# Patient Record
Sex: MALE | Race: WHITE | NOT HISPANIC OR LATINO | URBAN - METROPOLITAN AREA
[De-identification: names, ages, dates, MRNs, and addresses within clinical notes are randomized per-mention and may not be internally consistent; named-entity substitution may affect disease eponyms.]

---

## 2022-02-02 ENCOUNTER — OUTSIDE PLACE OF SERVICE (OUTPATIENT)
Dept: FAMILY MEDICINE | Facility: CLINIC | Age: 77
End: 2022-02-02
Payer: MEDICARE

## 2022-02-02 PROCEDURE — 99307 PR NURSING FAC CARE, SUBSEQ, IMPROVING: ICD-10-PCS | Mod: ,,, | Performed by: FAMILY MEDICINE

## 2022-02-02 PROCEDURE — 99307 SBSQ NF CARE SF MDM 10: CPT | Mod: ,,, | Performed by: FAMILY MEDICINE

## 2022-02-09 ENCOUNTER — OUTSIDE PLACE OF SERVICE (OUTPATIENT)
Dept: FAMILY MEDICINE | Facility: CLINIC | Age: 77
End: 2022-02-09
Payer: MEDICARE

## 2022-02-09 PROCEDURE — 99499 NO LOS: ICD-10-PCS | Mod: ,,, | Performed by: FAMILY MEDICINE

## 2022-02-09 PROCEDURE — 99499 UNLISTED E&M SERVICE: CPT | Mod: ,,, | Performed by: FAMILY MEDICINE

## 2022-09-27 ENCOUNTER — HOSPITAL ENCOUNTER (EMERGENCY)
Facility: HOSPITAL | Age: 77
Discharge: HOME | End: 2022-09-28
Attending: EMERGENCY MEDICINE
Payer: MEDICARE

## 2022-09-27 DIAGNOSIS — R07.9 CHEST PAIN: ICD-10-CM

## 2022-09-27 LAB
ALBUMIN SERPL BCP-MCNC: 4.3 G/DL (ref 3.5–5.2)
ALP SERPL-CCNC: 89 U/L (ref 38–126)
ALT SERPL W/O P-5'-P-CCNC: 20 U/L (ref 10–44)
ANION GAP SERPL CALC-SCNC: 9 MMOL/L (ref 8–16)
AST SERPL-CCNC: 30 U/L (ref 15–46)
BASOPHILS # BLD AUTO: 0.03 K/UL (ref 0–0.2)
BASOPHILS NFR BLD: 0.4 % (ref 0–1.9)
BILIRUB SERPL-MCNC: 0.5 MG/DL (ref 0.1–1)
CALCIUM SERPL-MCNC: 10.1 MG/DL (ref 8.7–10.5)
CHLORIDE SERPL-SCNC: 90 MMOL/L (ref 95–110)
CO2 SERPL-SCNC: 45 MMOL/L (ref 23–29)
CREAT SERPL-MCNC: 0.75 MG/DL (ref 0.5–1.4)
DIFFERENTIAL METHOD: ABNORMAL
EOSINOPHIL # BLD AUTO: 0.1 K/UL (ref 0–0.5)
EOSINOPHIL NFR BLD: 1.9 % (ref 0–8)
ERYTHROCYTE [DISTWIDTH] IN BLOOD BY AUTOMATED COUNT: 13.3 % (ref 11.5–14.5)
EST. GFR  (NO RACE VARIABLE): >60 ML/MIN/1.73 M^2
GLUCOSE SERPL-MCNC: 114 MG/DL (ref 70–110)
HCT VFR BLD AUTO: 39.5 % (ref 40–54)
HGB BLD-MCNC: 11.6 G/DL (ref 14–18)
IMM GRANULOCYTES # BLD AUTO: 0.01 K/UL (ref 0–0.04)
IMM GRANULOCYTES NFR BLD AUTO: 0.1 % (ref 0–0.5)
LYMPHOCYTES # BLD AUTO: 1.1 K/UL (ref 1–4.8)
LYMPHOCYTES NFR BLD: 15.9 % (ref 18–48)
MCH RBC QN AUTO: 27.7 PG (ref 27–31)
MCHC RBC AUTO-ENTMCNC: 29.4 G/DL (ref 32–36)
MCV RBC AUTO: 94 FL (ref 82–98)
MONOCYTES # BLD AUTO: 0.7 K/UL (ref 0.3–1)
MONOCYTES NFR BLD: 10.7 % (ref 4–15)
NEUTROPHILS # BLD AUTO: 4.9 K/UL (ref 1.8–7.7)
NEUTROPHILS NFR BLD: 71 % (ref 38–73)
NRBC BLD-RTO: 0 /100 WBC
PLATELET # BLD AUTO: 169 K/UL (ref 150–450)
PMV BLD AUTO: 10.1 FL (ref 9.2–12.9)
POTASSIUM SERPL-SCNC: 4.7 MMOL/L (ref 3.5–5.1)
PROT SERPL-MCNC: 7.5 G/DL (ref 6–8.4)
RBC # BLD AUTO: 4.19 M/UL (ref 4.6–6.2)
SODIUM SERPL-SCNC: 144 MMOL/L (ref 136–145)
TROPONIN I SERPL-MCNC: <0.012 NG/ML (ref 0.01–0.03)
UUN UR-MCNC: 14 MG/DL (ref 2–20)
WBC # BLD AUTO: 6.91 K/UL (ref 3.9–12.7)

## 2022-09-27 PROCEDURE — 93010 ELECTROCARDIOGRAM REPORT: CPT | Mod: ,,, | Performed by: INTERNAL MEDICINE

## 2022-09-27 PROCEDURE — 85025 COMPLETE CBC W/AUTO DIFF WBC: CPT | Mod: ER | Performed by: EMERGENCY MEDICINE

## 2022-09-27 PROCEDURE — 93010 EKG 12-LEAD: ICD-10-PCS | Mod: ,,, | Performed by: INTERNAL MEDICINE

## 2022-09-27 PROCEDURE — 84484 ASSAY OF TROPONIN QUANT: CPT | Mod: ER | Performed by: EMERGENCY MEDICINE

## 2022-09-27 PROCEDURE — 99285 EMERGENCY DEPT VISIT HI MDM: CPT | Mod: 25,ER

## 2022-09-27 PROCEDURE — 93005 ELECTROCARDIOGRAM TRACING: CPT | Mod: ER

## 2022-09-27 PROCEDURE — 80053 COMPREHEN METABOLIC PANEL: CPT | Mod: ER | Performed by: EMERGENCY MEDICINE

## 2022-09-27 RX ORDER — MULTIVIT-MIN/IRON FUM/FOLIC AC 7.5 MG-4
TABLET ORAL
COMMUNITY
Start: 2022-09-09 | End: 2023-01-26

## 2022-09-27 RX ORDER — DIAZEPAM 5 MG/1
5 TABLET ORAL 2 TIMES DAILY PRN
COMMUNITY
Start: 2022-09-06

## 2022-09-27 RX ORDER — ACETAMINOPHEN 500 MG
500 TABLET ORAL EVERY 6 HOURS PRN
COMMUNITY
Start: 2022-09-06 | End: 2023-01-26

## 2022-09-27 RX ORDER — GUAIFENESIN 400 MG/1
TABLET ORAL
COMMUNITY
Start: 2022-09-06 | End: 2023-01-26

## 2022-09-27 RX ORDER — CALCIUM CARBONATE 600 MG
1 TABLET ORAL 2 TIMES DAILY
COMMUNITY
Start: 2022-09-06 | End: 2023-01-26

## 2022-09-27 RX ORDER — BUSPIRONE HYDROCHLORIDE 15 MG/1
30 TABLET ORAL 2 TIMES DAILY
COMMUNITY
Start: 2022-08-08

## 2022-09-27 RX ORDER — NAPROXEN SODIUM 220 MG/1
324 TABLET, FILM COATED ORAL
Status: DISCONTINUED | OUTPATIENT
Start: 2022-09-27 | End: 2022-09-28 | Stop reason: HOSPADM

## 2022-09-27 RX ORDER — ALBUTEROL SULFATE 0.83 MG/ML
2.5 SOLUTION RESPIRATORY (INHALATION) 2 TIMES DAILY PRN
COMMUNITY
Start: 2022-06-03

## 2022-09-27 RX ORDER — PREDNISONE 10 MG/1
TABLET ORAL
COMMUNITY
Start: 2022-09-08 | End: 2023-01-26

## 2022-09-27 RX ORDER — PRAVASTATIN SODIUM 80 MG/1
80 TABLET ORAL DAILY PRN
COMMUNITY
Start: 2022-08-31 | End: 2023-01-26

## 2022-09-27 RX ORDER — APIXABAN 5 MG/1
5 TABLET, FILM COATED ORAL 2 TIMES DAILY
COMMUNITY
Start: 2022-09-06

## 2022-09-27 RX ORDER — TRAZODONE HYDROCHLORIDE 100 MG/1
400 TABLET ORAL NIGHTLY PRN
COMMUNITY
Start: 2022-09-15

## 2022-09-27 RX ORDER — CELECOXIB 100 MG/1
100 CAPSULE ORAL 2 TIMES DAILY
COMMUNITY
Start: 2022-08-29 | End: 2023-01-26

## 2022-09-27 RX ORDER — CITALOPRAM 40 MG/1
40 TABLET, FILM COATED ORAL DAILY
COMMUNITY
Start: 2022-07-06

## 2022-09-27 RX ORDER — POLYETHYLENE GLYCOL 3350 17 G/17G
POWDER, FOR SOLUTION ORAL
COMMUNITY
Start: 2022-09-06 | End: 2023-01-26

## 2022-09-27 RX ORDER — CHOLECALCIFEROL (VITAMIN D3) 50 MCG
1 TABLET ORAL DAILY
COMMUNITY
Start: 2022-08-30 | End: 2023-01-26

## 2022-09-28 VITALS
RESPIRATION RATE: 24 BRPM | SYSTOLIC BLOOD PRESSURE: 142 MMHG | WEIGHT: 168.19 LBS | OXYGEN SATURATION: 96 % | DIASTOLIC BLOOD PRESSURE: 63 MMHG | HEART RATE: 100 BPM | TEMPERATURE: 98 F

## 2022-09-28 LAB — TROPONIN I SERPL-MCNC: <0.012 NG/ML (ref 0.01–0.03)

## 2022-09-28 PROCEDURE — 84484 ASSAY OF TROPONIN QUANT: CPT | Mod: ER | Performed by: EMERGENCY MEDICINE

## 2022-09-28 NOTE — ED PROVIDER NOTES
Encounter Date: 9/27/2022       History     Chief Complaint   Patient presents with    Chest Pain     Reports to ED via EMS from [facility] c c/o chest pain x few months. Pain to all of R side of body. +R sided, sharp pain. 4L NC. Per EMS, given IM zofran, ASA, and 2 Nitro.      Patient currently presents with chief complaint of chest pain.  This began yesterday and has been sustained throughout the day today though the patient describes similar episodes intermittently for several months now.  This is localized to the mid chest region.  Patient denies shortness of breath, denies palpitations,  denies nausea, denies diaphoresis.  Radiation of pain:  none.  Patient denies aspirin use in the last 24 hours. Patient denies history of known CAD.      Review of patient's allergies indicates:  No Known Allergies  Past Medical History:   Diagnosis Date    Acute bronchospasm     Acute pharyngitis     Allergic urticaria     Anxiety disorder, unspecified     Cerebral atherosclerosis     Conjunctivitis     Constipation     COPD (chronic obstructive pulmonary disease)     Deficiency of other vitamins     Hypocalcemia     Insomnia     Malignant neoplasm of unspecified part of unspecified bronchus or lung     Mixed hyperlipidemia     Other pulmonary embolism without acute cor pulmonale     Pain     Pressure ulcer of unspecified site, unspecified stage     stage 2 of sacral region    PTSD (post-traumatic stress disorder)     Seborrhea capitis     Xerosis cutis      History reviewed. No pertinent surgical history.  History reviewed. No pertinent family history.  Social History     Tobacco Use    Smoking status: Unknown   Substance Use Topics    Alcohol use: Not Currently    Drug use: Not Currently     Review of Systems   Constitutional:  Negative for chills and fever.   HENT:  Negative for congestion and sore throat.    Respiratory:  Negative for chest tightness and shortness of breath.    Cardiovascular:  Positive for chest pain.  Negative for palpitations.   Gastrointestinal:  Negative for abdominal pain and vomiting.   Genitourinary:  Negative for difficulty urinating and dysuria.   Skin:  Negative for color change and rash.   Neurological:  Negative for weakness and numbness.   Hematological:  Negative for adenopathy.   All other systems reviewed and are negative.    Physical Exam     Initial Vitals   BP Pulse Resp Temp SpO2   09/27/22 2233 09/27/22 2233 09/27/22 2233 09/27/22 2245 09/27/22 2233   (!) 162/83 (!) 111 (!) 26 98.1 °F (36.7 °C) (!) 93 %      MAP       --                Physical Exam    Constitutional: He appears well-developed and well-nourished. He is not diaphoretic. No distress.   HENT:   Head: Normocephalic and atraumatic.   Nose: Nose normal.   Mouth/Throat: Oropharynx is clear and moist.   Eyes: Conjunctivae are normal. No scleral icterus.   Neck: Neck supple. No JVD present.   Cardiovascular:  Normal rate, regular rhythm, normal heart sounds and intact distal pulses.           Pulmonary/Chest: No respiratory distress. He has wheezes.   Abdominal: Abdomen is soft. There is no abdominal tenderness.   Musculoskeletal:         General: No edema. Normal range of motion.      Cervical back: Neck supple.     Neurological: He is alert and oriented to person, place, and time.   Skin: Skin is warm and dry.   Psychiatric: He has a normal mood and affect. His behavior is normal.       ED Course   Procedures  Labs Reviewed   CBC W/ AUTO DIFFERENTIAL - Abnormal; Notable for the following components:       Result Value    RBC 4.19 (*)     Hemoglobin 11.6 (*)     Hematocrit 39.5 (*)     MCHC 29.4 (*)     Lymph % 15.9 (*)     All other components within normal limits   COMPREHENSIVE METABOLIC PANEL - Abnormal; Notable for the following components:    Chloride 90 (*)     CO2 45 (*)     Glucose 114 (*)     All other components within normal limits    Narrative:     C02   critical result(s) called and verbal readback obtained from   Anay  Lincoln HERNADEZ by HERON 09/27/2022 23:13   TROPONIN I   TROPONIN I     EKG Readings: (Independently Interpreted)   Initial Reading: No STEMI. Rhythm: Normal Sinus Rhythm. Heart Rate: 105. Ectopy: PVCs Rare.     Imaging Results              X-Ray Chest AP Portable (Final result)  Result time 09/27/22 22:55:10      Final result by Jesse Bell MD (09/27/22 22:55:10)                   Impression:      Extensive emphysema.  Right upper lobe scarring.  No acute process seen      Electronically signed by: Ray Molina  Date:    09/27/2022  Time:    22:55               Narrative:    EXAMINATION:  XR CHEST AP PORTABLE    CLINICAL HISTORY:  Chest Pain;    TECHNIQUE:  Single frontal view of the chest was performed.    COMPARISON:  None    FINDINGS:  Suggestion of emphysematous changes.  Upper lung zone reticular opacities no pleural effusion or pneumothorax.    The cardiac silhouette is normal in size. The hilar and mediastinal contours are unremarkable.    Bones are intact.                                       Medications   aspirin chewable tablet 324 mg (324 mg Oral Not Given 9/27/22 2230)     Medical Decision Making:   Independently Interpreted Test(s):   I have ordered and independently interpreted EKG Reading(s) - see prior notes  Clinical Tests:   Lab Tests: Ordered and Reviewed  Radiological Study: Ordered and Reviewed  ED Management:  All findings were reviewed with the patient/family in detail along with the diagnosis of chest pain.  Given the extended period of sustained CP prior to arrival and initial lab draw I feel a series of unremarkable troponin levels is adequate to rule out ACS in this setting.  Additionally, I have no considerable suspicion for aortic dissection/aneurysm, esophageal perforation, or acute pulmonary complications based on the presentation, exam, lab results, or imaging.    I see no indication of an emergent process beyond that addressed during our encounter but have duly counseled the  patient/family regarding the need for prompt outpatient follow-up for further evaluation as well as the indications that should prompt immediate return to the emergency room should new or worrisome developments occur.  The patient has been provided with both verbal and written instructions following the encounter.  The patient/family communicates understanding of all this information and all remaining questions and concerns were addressed at this time.                         Clinical Impression:   Final diagnoses:  [R07.9] Chest pain        ED Disposition Condition    Discharge Stable          ED Prescriptions    None       Follow-up Information       Follow up With Specialties Details Why Contact Info    PCP  Schedule an appointment as soon as possible for a visit  for reassessment     [facility] Emergency Dept Emergency Medicine Go to  As needed, If symptoms worsen [address] [facility] [address] [phone]             Bert Arboleda MD  09/28/22 2548

## 2022-10-31 ENCOUNTER — TELEPHONE (OUTPATIENT)
Dept: FAMILY MEDICINE | Facility: CLINIC | Age: 77
End: 2022-10-31
Payer: MEDICARE

## 2022-10-31 NOTE — TELEPHONE ENCOUNTER
Please sign the CTI that was faxed here on today and fax it back to them.  [facility]     ------------------------------------------------------------------------- Message from Martell Ontiveros sent at 10/31/2022  1:11 PM CDT -----  Contact: Harriet  .Type:  Needs Medical Advice    Who Called: [facility]  Would the patient rather a call back or a response via [facility]? Call  Best Call Back Number: [phone]  Additional Information:   Marily wants to know if [facility] needs the CTI faxed or picked up.

## 2023-01-25 ENCOUNTER — HOSPITAL ENCOUNTER (OUTPATIENT)
Facility: HOSPITAL | Age: 78
End: 2023-01-28
Attending: EMERGENCY MEDICINE | Admitting: EMERGENCY MEDICINE
Payer: MEDICARE

## 2023-01-25 DIAGNOSIS — J44.1 COPD WITH ACUTE EXACERBATION: Primary | ICD-10-CM

## 2023-01-25 DIAGNOSIS — R06.02 SOB (SHORTNESS OF BREATH): ICD-10-CM

## 2023-01-25 LAB
ALBUMIN SERPL BCP-MCNC: 3.6 G/DL (ref 3.5–5.2)
ALP SERPL-CCNC: 104 U/L (ref 55–135)
ALT SERPL W/O P-5'-P-CCNC: 19 U/L (ref 10–44)
ANION GAP SERPL CALC-SCNC: 9 MMOL/L (ref 8–16)
AST SERPL-CCNC: 22 U/L (ref 10–40)
BASOPHILS # BLD AUTO: 0.01 K/UL (ref 0–0.2)
BASOPHILS NFR BLD: 0.2 % (ref 0–1.9)
BILIRUB SERPL-MCNC: 0.5 MG/DL (ref 0.1–1)
BNP SERPL-MCNC: <10 PG/ML (ref 0–99)
BUN SERPL-MCNC: 13 MG/DL (ref 8–23)
CALCIUM SERPL-MCNC: 10 MG/DL (ref 8.7–10.5)
CHLORIDE SERPL-SCNC: 96 MMOL/L (ref 95–110)
CO2 SERPL-SCNC: 36 MMOL/L (ref 23–29)
CREAT SERPL-MCNC: 0.9 MG/DL (ref 0.5–1.4)
DIFFERENTIAL METHOD: ABNORMAL
EOSINOPHIL # BLD AUTO: 0 K/UL (ref 0–0.5)
EOSINOPHIL NFR BLD: 0 % (ref 0–8)
ERYTHROCYTE [DISTWIDTH] IN BLOOD BY AUTOMATED COUNT: 14.8 % (ref 11.5–14.5)
EST. GFR  (NO RACE VARIABLE): >60 ML/MIN/1.73 M^2
GLUCOSE SERPL-MCNC: 117 MG/DL (ref 70–110)
HCT VFR BLD AUTO: 41.5 % (ref 40–54)
HGB BLD-MCNC: 12.3 G/DL (ref 14–18)
IMM GRANULOCYTES # BLD AUTO: 0.01 K/UL (ref 0–0.04)
IMM GRANULOCYTES NFR BLD AUTO: 0.2 % (ref 0–0.5)
LYMPHOCYTES # BLD AUTO: 0.6 K/UL (ref 1–4.8)
LYMPHOCYTES NFR BLD: 11.3 % (ref 18–48)
MCH RBC QN AUTO: 26.6 PG (ref 27–31)
MCHC RBC AUTO-ENTMCNC: 29.6 G/DL (ref 32–36)
MCV RBC AUTO: 90 FL (ref 82–98)
MONOCYTES # BLD AUTO: 0.1 K/UL (ref 0.3–1)
MONOCYTES NFR BLD: 2.7 % (ref 4–15)
NEUTROPHILS # BLD AUTO: 4.4 K/UL (ref 1.8–7.7)
NEUTROPHILS NFR BLD: 85.6 % (ref 38–73)
NRBC BLD-RTO: 0 /100 WBC
PLATELET # BLD AUTO: 187 K/UL (ref 150–450)
PMV BLD AUTO: 10.6 FL (ref 9.2–12.9)
POTASSIUM SERPL-SCNC: 4.7 MMOL/L (ref 3.5–5.1)
PROT SERPL-MCNC: 7.1 G/DL (ref 6–8.4)
RBC # BLD AUTO: 4.63 M/UL (ref 4.6–6.2)
SODIUM SERPL-SCNC: 141 MMOL/L (ref 136–145)
TROPONIN I SERPL DL<=0.01 NG/ML-MCNC: 0.02 NG/ML (ref 0–0.03)
WBC # BLD AUTO: 5.13 K/UL (ref 3.9–12.7)

## 2023-01-25 PROCEDURE — 93010 EKG 12-LEAD: ICD-10-PCS | Mod: ,,, | Performed by: INTERNAL MEDICINE

## 2023-01-25 PROCEDURE — 93005 ELECTROCARDIOGRAM TRACING: CPT

## 2023-01-25 PROCEDURE — 80053 COMPREHEN METABOLIC PANEL: CPT | Performed by: EMERGENCY MEDICINE

## 2023-01-25 PROCEDURE — 93010 ELECTROCARDIOGRAM REPORT: CPT | Mod: ,,, | Performed by: INTERNAL MEDICINE

## 2023-01-25 PROCEDURE — 85025 COMPLETE CBC W/AUTO DIFF WBC: CPT | Performed by: EMERGENCY MEDICINE

## 2023-01-25 PROCEDURE — 83880 ASSAY OF NATRIURETIC PEPTIDE: CPT | Performed by: EMERGENCY MEDICINE

## 2023-01-25 PROCEDURE — 99285 PR EMERGENCY DEPT VISIT,LEVEL V: ICD-10-PCS | Mod: CS,,, | Performed by: EMERGENCY MEDICINE

## 2023-01-25 PROCEDURE — 84484 ASSAY OF TROPONIN QUANT: CPT | Performed by: EMERGENCY MEDICINE

## 2023-01-25 PROCEDURE — 99285 EMERGENCY DEPT VISIT HI MDM: CPT | Mod: 25

## 2023-01-25 PROCEDURE — 99285 EMERGENCY DEPT VISIT HI MDM: CPT | Mod: CS,,, | Performed by: EMERGENCY MEDICINE

## 2023-01-25 RX ORDER — IPRATROPIUM BROMIDE AND ALBUTEROL SULFATE 2.5; .5 MG/3ML; MG/3ML
3 SOLUTION RESPIRATORY (INHALATION)
Status: COMPLETED | OUTPATIENT
Start: 2023-01-26 | End: 2023-01-26

## 2023-01-25 RX ORDER — METHYLPREDNISOLONE SOD SUCC 125 MG
125 VIAL (EA) INJECTION
Status: COMPLETED | OUTPATIENT
Start: 2023-01-26 | End: 2023-01-26

## 2023-01-26 PROBLEM — J44.1 COPD WITH ACUTE EXACERBATION: Status: ACTIVE | Noted: 2023-01-26

## 2023-01-26 PROBLEM — I26.99 OTHER PULMONARY EMBOLISM WITHOUT ACUTE COR PULMONALE: Status: ACTIVE | Noted: 2023-01-26

## 2023-01-26 PROBLEM — F41.9 ANXIETY: Status: ACTIVE | Noted: 2023-01-26

## 2023-01-26 PROBLEM — E78.5 HLD (HYPERLIPIDEMIA): Status: ACTIVE | Noted: 2023-01-26

## 2023-01-26 LAB
ALLENS TEST: ABNORMAL
ANION GAP SERPL CALC-SCNC: 14 MMOL/L (ref 8–16)
BASOPHILS # BLD AUTO: 0 K/UL (ref 0–0.2)
BASOPHILS NFR BLD: 0 % (ref 0–1.9)
BUN SERPL-MCNC: 14 MG/DL (ref 8–23)
CALCIUM SERPL-MCNC: 9.9 MG/DL (ref 8.7–10.5)
CHLORIDE SERPL-SCNC: 98 MMOL/L (ref 95–110)
CO2 SERPL-SCNC: 29 MMOL/L (ref 23–29)
CREAT SERPL-MCNC: 0.8 MG/DL (ref 0.5–1.4)
D DIMER PPP IA.FEU-MCNC: 0.64 MG/L FEU
DELSYS: ABNORMAL
DIFFERENTIAL METHOD: ABNORMAL
EOSINOPHIL # BLD AUTO: 0 K/UL (ref 0–0.5)
EOSINOPHIL NFR BLD: 0 % (ref 0–8)
ERYTHROCYTE [DISTWIDTH] IN BLOOD BY AUTOMATED COUNT: 14.8 % (ref 11.5–14.5)
EST. GFR  (NO RACE VARIABLE): >60 ML/MIN/1.73 M^2
GLUCOSE SERPL-MCNC: 135 MG/DL (ref 70–110)
HCO3 UR-SCNC: 36.1 MMOL/L (ref 24–28)
HCT VFR BLD AUTO: 40.3 % (ref 40–54)
HGB BLD-MCNC: 11.8 G/DL (ref 14–18)
IMM GRANULOCYTES # BLD AUTO: 0.02 K/UL (ref 0–0.04)
IMM GRANULOCYTES NFR BLD AUTO: 0.4 % (ref 0–0.5)
LYMPHOCYTES # BLD AUTO: 0.3 K/UL (ref 1–4.8)
LYMPHOCYTES NFR BLD: 5.6 % (ref 18–48)
MAGNESIUM SERPL-MCNC: 2 MG/DL (ref 1.6–2.6)
MCH RBC QN AUTO: 26.6 PG (ref 27–31)
MCHC RBC AUTO-ENTMCNC: 29.3 G/DL (ref 32–36)
MCV RBC AUTO: 91 FL (ref 82–98)
MODE: ABNORMAL
MONOCYTES # BLD AUTO: 0.1 K/UL (ref 0.3–1)
MONOCYTES NFR BLD: 1.3 % (ref 4–15)
NEUTROPHILS # BLD AUTO: 4.5 K/UL (ref 1.8–7.7)
NEUTROPHILS NFR BLD: 92.7 % (ref 38–73)
NRBC BLD-RTO: 0 /100 WBC
PCO2 BLDA: 54.9 MMHG (ref 35–45)
PH SMN: 7.42 [PH] (ref 7.35–7.45)
PHOSPHATE SERPL-MCNC: 2.8 MG/DL (ref 2.7–4.5)
PLATELET # BLD AUTO: 172 K/UL (ref 150–450)
PMV BLD AUTO: 9.7 FL (ref 9.2–12.9)
PO2 BLDA: 139 MMHG (ref 80–100)
POC BE: 12 MMOL/L
POC SATURATED O2: 99 % (ref 95–100)
POC TCO2: 38 MMOL/L (ref 23–27)
POTASSIUM SERPL-SCNC: 4.4 MMOL/L (ref 3.5–5.1)
RBC # BLD AUTO: 4.43 M/UL (ref 4.6–6.2)
SAMPLE: ABNORMAL
SARS-COV-2 RDRP RESP QL NAA+PROBE: NEGATIVE
SITE: ABNORMAL
SODIUM SERPL-SCNC: 141 MMOL/L (ref 136–145)
WBC # BLD AUTO: 4.8 K/UL (ref 3.9–12.7)

## 2023-01-26 PROCEDURE — 25000242 PHARM REV CODE 250 ALT 637 W/ HCPCS

## 2023-01-26 PROCEDURE — 63600175 PHARM REV CODE 636 W HCPCS

## 2023-01-26 PROCEDURE — 36600 WITHDRAWAL OF ARTERIAL BLOOD: CPT

## 2023-01-26 PROCEDURE — G0378 HOSPITAL OBSERVATION PER HR: HCPCS

## 2023-01-26 PROCEDURE — 85379 FIBRIN DEGRADATION QUANT: CPT

## 2023-01-26 PROCEDURE — 94640 AIRWAY INHALATION TREATMENT: CPT | Mod: XB

## 2023-01-26 PROCEDURE — 94761 N-INVAS EAR/PLS OXIMETRY MLT: CPT

## 2023-01-26 PROCEDURE — 25000003 PHARM REV CODE 250

## 2023-01-26 PROCEDURE — U0002 COVID-19 LAB TEST NON-CDC: HCPCS | Performed by: EMERGENCY MEDICINE

## 2023-01-26 PROCEDURE — 27000190 HC CPAP FULL FACE MASK W/VALVE

## 2023-01-26 PROCEDURE — 94660 CPAP INITIATION&MGMT: CPT

## 2023-01-26 PROCEDURE — 80048 BASIC METABOLIC PNL TOTAL CA: CPT

## 2023-01-26 PROCEDURE — 99223 1ST HOSP IP/OBS HIGH 75: CPT | Mod: ,,,

## 2023-01-26 PROCEDURE — 96374 THER/PROPH/DIAG INJ IV PUSH: CPT

## 2023-01-26 PROCEDURE — 25000242 PHARM REV CODE 250 ALT 637 W/ HCPCS: Performed by: EMERGENCY MEDICINE

## 2023-01-26 PROCEDURE — 85025 COMPLETE CBC W/AUTO DIFF WBC: CPT

## 2023-01-26 PROCEDURE — 82803 BLOOD GASES ANY COMBINATION: CPT

## 2023-01-26 PROCEDURE — 99223 PR INITIAL HOSPITAL CARE,LEVL III: ICD-10-PCS | Mod: ,,,

## 2023-01-26 PROCEDURE — 99900035 HC TECH TIME PER 15 MIN (STAT)

## 2023-01-26 PROCEDURE — 63600175 PHARM REV CODE 636 W HCPCS: Performed by: EMERGENCY MEDICINE

## 2023-01-26 PROCEDURE — 84100 ASSAY OF PHOSPHORUS: CPT

## 2023-01-26 PROCEDURE — 27000221 HC OXYGEN, UP TO 24 HOURS

## 2023-01-26 PROCEDURE — 83735 ASSAY OF MAGNESIUM: CPT

## 2023-01-26 RX ORDER — CAMPHOR AND MENTHOL 5; 5 MG/ML; MG/ML
LOTION TOPICAL
COMMUNITY

## 2023-01-26 RX ORDER — ACETAMINOPHEN 500 MG
1000 TABLET ORAL EVERY 8 HOURS PRN
Status: DISCONTINUED | OUTPATIENT
Start: 2023-01-26 | End: 2023-01-28 | Stop reason: HOSPADM

## 2023-01-26 RX ORDER — ACETAMINOPHEN 325 MG/1
650 TABLET ORAL EVERY 4 HOURS PRN
Status: DISCONTINUED | OUTPATIENT
Start: 2023-01-26 | End: 2023-01-28 | Stop reason: HOSPADM

## 2023-01-26 RX ORDER — IPRATROPIUM BROMIDE AND ALBUTEROL SULFATE 2.5; .5 MG/3ML; MG/3ML
3 SOLUTION RESPIRATORY (INHALATION)
Status: COMPLETED | OUTPATIENT
Start: 2023-01-26 | End: 2023-01-26

## 2023-01-26 RX ORDER — IPRATROPIUM BROMIDE AND ALBUTEROL SULFATE 2.5; .5 MG/3ML; MG/3ML
3 SOLUTION RESPIRATORY (INHALATION)
Status: DISCONTINUED | OUTPATIENT
Start: 2023-01-26 | End: 2023-01-26

## 2023-01-26 RX ORDER — ALBUTEROL SULFATE 90 UG/1
2 AEROSOL, METERED RESPIRATORY (INHALATION) 4 TIMES DAILY PRN
COMMUNITY

## 2023-01-26 RX ORDER — FLUTICASONE FUROATE AND VILANTEROL 100; 25 UG/1; UG/1
1 POWDER RESPIRATORY (INHALATION) DAILY
Status: DISCONTINUED | OUTPATIENT
Start: 2023-01-26 | End: 2023-01-28 | Stop reason: HOSPADM

## 2023-01-26 RX ORDER — IPRATROPIUM BROMIDE AND ALBUTEROL SULFATE 2.5; .5 MG/3ML; MG/3ML
SOLUTION RESPIRATORY (INHALATION)
Status: COMPLETED
Start: 2023-01-26 | End: 2023-01-26

## 2023-01-26 RX ORDER — UREA 200 MG/G
CREAM TOPICAL
COMMUNITY

## 2023-01-26 RX ORDER — TIOTROPIUM BROMIDE INHALATION SPRAY 3.12 UG/1
2 SPRAY, METERED RESPIRATORY (INHALATION) DAILY
COMMUNITY

## 2023-01-26 RX ORDER — CITALOPRAM 20 MG/1
40 TABLET, FILM COATED ORAL DAILY
Status: DISCONTINUED | OUTPATIENT
Start: 2023-01-26 | End: 2023-01-28 | Stop reason: HOSPADM

## 2023-01-26 RX ORDER — KETOCONAZOLE 20 MG/ML
SHAMPOO, SUSPENSION TOPICAL
COMMUNITY

## 2023-01-26 RX ORDER — ONDANSETRON 8 MG/1
8 TABLET, ORALLY DISINTEGRATING ORAL EVERY 8 HOURS PRN
Status: DISCONTINUED | OUTPATIENT
Start: 2023-01-26 | End: 2023-01-28 | Stop reason: HOSPADM

## 2023-01-26 RX ORDER — PRAVASTATIN SODIUM 40 MG/1
80 TABLET ORAL DAILY
Status: DISCONTINUED | OUTPATIENT
Start: 2023-01-26 | End: 2023-01-28 | Stop reason: HOSPADM

## 2023-01-26 RX ORDER — PRAZOSIN HYDROCHLORIDE 5 MG/1
5 CAPSULE ORAL NIGHTLY
COMMUNITY

## 2023-01-26 RX ORDER — FLUTICASONE PROPIONATE AND SALMETEROL 250; 50 UG/1; UG/1
1 POWDER RESPIRATORY (INHALATION) 2 TIMES DAILY
COMMUNITY

## 2023-01-26 RX ORDER — SODIUM CHLORIDE 0.9 % (FLUSH) 0.9 %
3 SYRINGE (ML) INJECTION
Status: DISCONTINUED | OUTPATIENT
Start: 2023-01-26 | End: 2023-01-28 | Stop reason: HOSPADM

## 2023-01-26 RX ORDER — IPRATROPIUM BROMIDE AND ALBUTEROL SULFATE 2.5; .5 MG/3ML; MG/3ML
3 SOLUTION RESPIRATORY (INHALATION) EVERY 6 HOURS PRN
Status: DISCONTINUED | OUTPATIENT
Start: 2023-01-26 | End: 2023-01-28 | Stop reason: HOSPADM

## 2023-01-26 RX ORDER — POLYETHYLENE GLYCOL 3350 17 G/17G
17 POWDER, FOR SOLUTION ORAL 2 TIMES DAILY PRN
Status: DISCONTINUED | OUTPATIENT
Start: 2023-01-26 | End: 2023-01-28 | Stop reason: HOSPADM

## 2023-01-26 RX ORDER — HYDROCORTISONE 25 MG/G
CREAM TOPICAL
COMMUNITY

## 2023-01-26 RX ORDER — ROFLUMILAST 500 UG/1
500 TABLET ORAL DAILY
COMMUNITY

## 2023-01-26 RX ORDER — TRAZODONE HYDROCHLORIDE 100 MG/1
200 TABLET ORAL NIGHTLY PRN
Status: DISCONTINUED | OUTPATIENT
Start: 2023-01-26 | End: 2023-01-28 | Stop reason: HOSPADM

## 2023-01-26 RX ORDER — PREDNISONE 20 MG/1
40 TABLET ORAL DAILY
Status: DISCONTINUED | OUTPATIENT
Start: 2023-01-26 | End: 2023-01-28 | Stop reason: HOSPADM

## 2023-01-26 RX ORDER — GUAIFENESIN/DEXTROMETHORPHAN 100-10MG/5
5 SYRUP ORAL EVERY 6 HOURS PRN
Status: DISCONTINUED | OUTPATIENT
Start: 2023-01-26 | End: 2023-01-28 | Stop reason: HOSPADM

## 2023-01-26 RX ORDER — KETOCONAZOLE 20 MG/G
CREAM TOPICAL
COMMUNITY

## 2023-01-26 RX ORDER — PROMETHAZINE HYDROCHLORIDE 25 MG/1
25 TABLET ORAL EVERY 6 HOURS PRN
Status: DISCONTINUED | OUTPATIENT
Start: 2023-01-26 | End: 2023-01-28 | Stop reason: HOSPADM

## 2023-01-26 RX ORDER — GUAIFENESIN 100 MG/5ML
20 SOLUTION ORAL 2 TIMES DAILY PRN
COMMUNITY

## 2023-01-26 RX ADMIN — GUAIFENESIN AND DEXTROMETHORPHAN 5 ML: 100; 10 SYRUP ORAL at 10:01

## 2023-01-26 RX ADMIN — PRAVASTATIN SODIUM 80 MG: 80 TABLET ORAL at 10:01

## 2023-01-26 RX ADMIN — IPRATROPIUM BROMIDE AND ALBUTEROL SULFATE 3 ML: .5; 3 SOLUTION RESPIRATORY (INHALATION) at 07:01

## 2023-01-26 RX ADMIN — IPRATROPIUM BROMIDE AND ALBUTEROL SULFATE 3 ML: .5; 3 SOLUTION RESPIRATORY (INHALATION) at 09:01

## 2023-01-26 RX ADMIN — GUAIFENESIN AND DEXTROMETHORPHAN 5 ML: 100; 10 SYRUP ORAL at 08:01

## 2023-01-26 RX ADMIN — IPRATROPIUM BROMIDE AND ALBUTEROL SULFATE 3 ML: .5; 3 SOLUTION RESPIRATORY (INHALATION) at 12:01

## 2023-01-26 RX ADMIN — CITALOPRAM HYDROBROMIDE 40 MG: 20 TABLET ORAL at 08:01

## 2023-01-26 RX ADMIN — BUSPIRONE HYDROCHLORIDE 15 MG: 10 TABLET ORAL at 08:01

## 2023-01-26 RX ADMIN — APIXABAN 5 MG: 5 TABLET, FILM COATED ORAL at 08:01

## 2023-01-26 RX ADMIN — METHYLPREDNISOLONE SODIUM SUCCINATE 125 MG: 125 INJECTION, POWDER, FOR SOLUTION INTRAMUSCULAR; INTRAVENOUS at 12:01

## 2023-01-26 RX ADMIN — APIXABAN 5 MG: 5 TABLET, FILM COATED ORAL at 10:01

## 2023-01-26 RX ADMIN — FLUTICASONE FUROATE AND VILANTEROL TRIFENATATE 1 PUFF: 100; 25 POWDER RESPIRATORY (INHALATION) at 12:01

## 2023-01-26 RX ADMIN — BUSPIRONE HYDROCHLORIDE 15 MG: 10 TABLET ORAL at 10:01

## 2023-01-26 RX ADMIN — IPRATROPIUM BROMIDE AND ALBUTEROL SULFATE 3 ML: 2.5; .5 SOLUTION RESPIRATORY (INHALATION) at 10:01

## 2023-01-26 RX ADMIN — TRAZODONE HYDROCHLORIDE 200 MG: 50 TABLET ORAL at 04:01

## 2023-01-26 RX ADMIN — IPRATROPIUM BROMIDE AND ALBUTEROL SULFATE 3 ML: .5; 3 SOLUTION RESPIRATORY (INHALATION) at 02:01

## 2023-01-26 RX ADMIN — PREDNISONE 40 MG: 20 TABLET ORAL at 08:01

## 2023-01-26 NOTE — H&P
"[facility] - Emergency Dept  [facility] Medicine  History & Physical    Patient Name: Gabriel Avendano  MRN: [id number]  Patient Class: OP- Observation  Admission Date: 1/25/2023  Attending Physician: Natali Merritt MD   Primary Care Provider: Primary Doctor No         Patient information was obtained from patient and ER records.     Subjective:     Principal Problem:COPD with acute exacerbation    Chief Complaint:   Chief Complaint   Patient presents with    Shortness of Breath     SOB started last night. [facility]. 3L NC home oxygen---- 100%. Pt states "I think I need to get admitted."         HPI: Gabriel Avendano is a 77M with COPD on home 3 L nasal cannula, HTN, HLD, lung cancer, anxiety presenting with acute onset shortness of breath.  He reports that last night at his nursing home, he was put on a new CPAP machine and they did not know how to connect it to the machine, but left the mask on him all night, without supplemental oxygen, and this morning he was found to be saturating in the 60s. [facility].  He reports that he was told there that he could not be admitted because there were no beds.  He still feels short of breath and is quite worried. He has cough with white sputum at baseline, no severe worsening. Also has chest pain that is reproducible with touch that has been present for years  without acute changes. He denies any recent fevers or chills. Denies other complaint such as dysuria, leg swelling, headache, falls.     In the ED, tachycardic and tachypneic. % on home 3L. Afebrile and normotensive. CBC and CMP unremarkable. BNP and trop negative. Covid negative. VBG with compensated respiratory acidosis. CXR with hyperinflated lungs with emphysematous architecture. Linear scarring mid to upper right lung, similar to prior. Given duonebs x3 and methylprednisolone IV.       Past Medical History:   Diagnosis Date    Acute " bronchospasm     Acute pharyngitis     Allergic urticaria     Anxiety disorder, unspecified     Cerebral atherosclerosis     Conjunctivitis     Constipation     COPD (chronic obstructive pulmonary disease)     Deficiency of other vitamins     Hypocalcemia     Insomnia     Malignant neoplasm of unspecified part of unspecified bronchus or lung     Mixed hyperlipidemia     Other pulmonary embolism without acute cor pulmonale     Pain     Pressure ulcer of unspecified site, unspecified stage     stage 2 of sacral region    PTSD (post-traumatic stress disorder)     Seborrhea capitis     Xerosis cutis        History reviewed. No pertinent surgical history.    Review of patient's allergies indicates:  No Known Allergies    No current facility-administered medications on file prior to encounter.     Current Outpatient Medications on File Prior to Encounter   Medication Sig    acetaminophen (TYLENOL) 500 MG tablet Take 500 mg by mouth every 6 (six) hours as needed.    albuterol (PROVENTIL) 2.5 mg /3 mL (0.083 %) nebulizer solution Take by nebulization.    busPIRone (BUSPAR) 15 MG tablet Take by mouth 2 (two) times daily.    calcium carbonate (OS-LING) 600 mg calcium (1,500 mg) Tab Take 1 tablet by mouth 2 (two) times daily.    celecoxib (CELEBREX) 100 MG capsule Take 100 mg by mouth 2 (two) times daily.    cholecalciferol, vitamin D3, (VITAMIN D3) 50 mcg (2,000 unit) Tab Take 1 tablet by mouth once daily.    citalopram (CELEXA) 40 MG tablet Take 40 mg by mouth once daily.    diazePAM (VALIUM) 5 MG tablet Take 5 mg by mouth 2 (two) times daily as needed.    ELIQUIS 5 mg Tab Take 5 mg by mouth 2 (two) times daily.    guaiFENesin (HUMIBID E) 400 mg Tab Take by mouth.    multivit-min-iron fum-folic ac 7.5 mg iron-400 mcg Tab Take by mouth.    polyethylene glycol (GLYCOLAX) 17 gram/dose powder Take by mouth.    pravastatin (PRAVACHOL) 80 MG tablet Take 80 mg by mouth daily as needed.    predniSONE  (DELTASONE) 10 MG tablet Take by mouth.    traZODone (DESYREL) 100 MG tablet Take 200 mg by mouth nightly as needed.     Family History    None       Tobacco Use    Smoking status: Unknown    Smokeless tobacco: Not on file   Substance and Sexual Activity    Alcohol use: Not Currently    Drug use: Not Currently    Sexual activity: Not on file     Review of Systems   Constitutional:  Negative for activity change, chills and fever.   HENT:  Negative for trouble swallowing.    Eyes:  Negative for photophobia and visual disturbance.   Respiratory:  Positive for cough and shortness of breath. Negative for chest tightness and wheezing.    Cardiovascular:  Positive for chest pain (chronic, unchanged). Negative for palpitations and leg swelling.   Gastrointestinal:  Negative for abdominal pain, constipation, diarrhea, nausea and vomiting.   Genitourinary:  Negative for dysuria, frequency, hematuria and urgency.   Musculoskeletal:  Negative for arthralgias, back pain and gait problem (wheelchair at baseline).   Skin:  Positive for rash (after chemo). Negative for color change.   Neurological:  Negative for dizziness, syncope, light-headedness, numbness and headaches.   Psychiatric/Behavioral:  Negative for agitation and confusion. The patient is nervous/anxious.    Objective:     Vital Signs (Most Recent):  Temp: 98.8 °F (37.1 °C) (01/25/23 2043)  Pulse: 105 (01/26/23 0240)  Resp: (!) 24 (01/26/23 0240)  BP: 136/74 (01/26/23 0100)  SpO2: 98 % (01/26/23 0240)   Vital Signs (24h Range):  Temp:  [98.8 °F (37.1 °C)] 98.8 °F (37.1 °C)  Pulse:  [100-109] 105  Resp:  [17-24] 24  SpO2:  [98 %-100 %] 98 %  BP: (123-149)/(73-82) 136/74        There is no height or weight on file to calculate BMI.    Physical Exam  Vitals and nursing note reviewed.   Constitutional:       General: He is not in acute distress.     Appearance: He is well-developed.   HENT:      Head: Normocephalic and atraumatic.      Mouth/Throat:      Pharynx: No  oropharyngeal exudate.   Eyes:      Extraocular Movements: Extraocular movements intact.      Conjunctiva/sclera: Conjunctivae normal.   Cardiovascular:      Rate and Rhythm: Regular rhythm. Tachycardia present.      Heart sounds: Normal heart sounds.   Pulmonary:      Comments: Diminished air movement. Some increased WOB  No wheezes, rales, rhonchi   Abdominal:      General: Bowel sounds are normal. There is no distension.      Palpations: Abdomen is soft.      Tenderness: There is no abdominal tenderness.   Musculoskeletal:         General: No tenderness. Normal range of motion.      Cervical back: Normal range of motion and neck supple.      Right lower leg: No edema.      Left lower leg: No edema.   Skin:     General: Skin is warm and dry.      Capillary Refill: Capillary refill takes less than 2 seconds.      Findings: Rash present.      Comments: Diffuse skin rash (present since chemotherapy tx per pt and follows with hemo/onc)   Neurological:      General: No focal deficit present.      Mental Status: He is alert and oriented to person, place, and time.      Cranial Nerves: No cranial nerve deficit.   Psychiatric:         Behavior: Behavior normal.         Thought Content: Thought content normal.         Judgment: Judgment normal.      Comments: Slightly anxious about prospect of being discharged at some point           Significant Labs: All pertinent labs within the past 24 hours have been reviewed.  ABGs:   Recent Labs   Lab 01/26/23  0240   PH 7.425   PCO2 54.9*   HCO3 36.1*   POCSATURATED 99   BE 12   PO2 139*     CBC:   Recent Labs   Lab 01/25/23 2146 01/26/23  0404   WBC 5.13 4.80   HGB 12.3* 11.8*   HCT 41.5 40.3    172     CMP:   Recent Labs   Lab 01/25/23 2146 01/26/23  0404    141   K 4.7 4.4   CL 96 98   CO2 36* 29   * 135*   BUN 13 14   CREATININE 0.9 0.8   CALCIUM 10.0 9.9   PROT 7.1  --    ALBUMIN 3.6  --    BILITOT 0.5  --    ALKPHOS 104  --    AST 22  --    ALT 19  --     ANIONGAP 9 14     Cardiac Markers:   Recent Labs   Lab 01/25/23  2146   BNP <10     Magnesium:   Recent Labs   Lab 01/26/23  0404   MG 2.0     Troponin:   Recent Labs   Lab 01/25/23 2146   TROPONINI 0.017       Significant Imaging: I have reviewed all pertinent imaging results/findings within the past 24 hours.  X-Ray Chest AP Portable  Narrative: EXAMINATION:  XR CHEST AP PORTABLE    CLINICAL HISTORY:  CHF;    TECHNIQUE:  Single frontal view of the chest was performed.    COMPARISON:  09/27/2022.    FINDINGS:  Hyperinflated lungs with emphysematous architecture.  Linear scarring mid to upper right lung, similar to prior.    No pneumothorax.    Heart and lungs  appear unchanged when allowing for differences in technique and positioning.  Impression: Hyperinflated lungs with emphysematous architecture. Linear scarring mid to upper right lung, similar to prior.    No significant change from prior study.    Electronically signed by: Milton De La Garza MD  Date:    01/26/2023  Time:    00:04       Assessment/Plan:     * COPD with acute exacerbation  77year old male with COPD and lung cancer (follows with heme/onc) who presents with increased dyspnea, increased WOB. No increased sputum production. Reportedly had O2 sats of 60% at nursing home earlier. Covid negative, no recent illness.  - Afebrile without leukocytosis  - requires 3L supplemental oxygen at baseline  - pulse oximetry 98% on 3L nasal cannula  - CXR demonstrated Hyperinflated lungs with emphysematous architecture, no acute change  - Venous Blood Gas result:  pO2 139, pCO2 54.9; pH 7.425;  HCO3 36, %O2 Sat 99  - S/p nebs x3 and methylprednisolone in the ED  - started Prednisone 40 mg daily x 5 days   - scheduled DuoNebs ordered  - Mucinex ordered  - CPAP qhs  - discuss CPAP technique/set up with nursing home prior to discharge to ensure proper use    Other pulmonary embolism without acute cor pulmonale  -history noted, continue home eliquis 5 mg BID    HLD  (hyperlipidemia)  -cont home pravastatin     Anxiety  Cont home buspar, celexa, and trazodone.     VTE Risk Mitigation (From admission, onward)         Ordered     apixaban tablet 5 mg  2 times daily         01/26/23 0612     IP VTE HIGH RISK PATIENT  Once         01/26/23 0209     Place sequential compression device  Until discontinued         01/26/23 0209                   Genevieve Shook PA-C  Department of Hospital Medicine   [facility] - Emergency Dept

## 2023-01-26 NOTE — PLAN OF CARE
"SW met with patient. Patient is a resident at [facility]. Patient stated that he [facility] Patient stated that he is 70% vetted. Patient stated that [facility] [place], "But it was for me" 'i wanted to be closer to my family".     Sera Stephen, MARY, [place] [place] Social Worker/  ER Department   [facility] - Emergency Dept  Initial Discharge Assessment       Primary Care Provider: Primary Doctor No    Admission Diagnosis: No admission diagnoses are documented for this encounter.    Admission Date: 1/25/2023  Expected Discharge Date:     Discharge Barriers Identified: (P) None    Payor: MEDICARE / Plan: MEDICARE PART A & B / Product Type: Government /     Extended Emergency Contact Information  Primary Emergency Contact: Manish Mead  Mobile Phone: [phone]  Relation: Friend  Preferred language: English   needed? No  Secondary Emergency Contact: dagoberto franklin  [place] Phone: [phone]  Mobile Phone: [phone]  Relation: Relative  Preferred language: English   needed? No    Discharge Plan A: (P) Return to nursing home  Discharge Plan B: (P) Return to Nursing Home    No Pharmacies Listed    Initial Assessment (most recent)       Adult Discharge Assessment - 01/26/23 0112          Discharge Assessment    Assessment Type Discharge Planning Assessment (P)      Confirmed/corrected address, phone number and insurance Yes (P)      Confirmed Demographics Correct on Facesheet (P)      Source of Information patient (P)      Does patient/caregiver understand observation status Yes (P)      Communicated ZACKARY with patient/caregiver Yes (P)      Reason For Admission SOB (P)      People in Home facility resident (P)      Facility Arrived From: [facility] (P)      Do you expect to return to your current living situation? Yes (P)      Do you have help at home or someone to help you manage your care at " home? No (P)      Prior to hospitilization cognitive status: Alert/Oriented (P)      Current cognitive status: Alert/Oriented (P)      Walking or Climbing Stairs ambulation difficulty, dependent;stair climbing difficulty, dependent;transferring difficulty, dependent (P)      Mobility Management wheel chair (P)      Dressing/Bathing bathing difficulty, dependent;dressing difficulty, dependent (P)      Equipment Currently Used at Home CPAP;wheelchair;oxygen (P)      Readmission within 30 days? No (P)      Patient currently being followed by outpatient case management? No (P)      Do you currently have service(s) that help you manage your care at home? No (P)      Do you take prescription medications? Yes (P)      Do you have prescription coverage? Yes (P)      Do you have any problems affording any of your prescribed medications? No (P)      Is the patient taking medications as prescribed? yes (P)      How do you get to doctors appointments? agency (P)      Are you on dialysis? No (P)      Do you take coumadin? No (P)      Discharge Plan A Return to nursing home (P)      Discharge Plan B Return to Nursing Home (P)      DME Needed Upon Discharge  none (P)      Discharge Plan discussed with: Patient (P)      Discharge Barriers Identified None (P)

## 2023-01-26 NOTE — ED TRIAGE NOTES
[facility], pt wears CPAP @ night and O2 was not on. This AM pt O2 sats were in the 60's. [facility] and [facility]. Pt states that he is still SOB and came to [facility] for eval, on 3 lpm N/C @ baseline.

## 2023-01-26 NOTE — ED NOTES
Bed: 20  Expected date: 1/25/23  Expected time: 11:27 PM  Means of arrival:   Comments:  Juan Alberto

## 2023-01-26 NOTE — HPI
Gabriel Avendano is a 77M with COPD on home 3 L nasal cannula, HTN, HLD, lung cancer, anxiety presenting with acute onset shortness of breath.  He reports that last night at his nursing home, he was put on a new CPAP machine and they did not know how to connect it to the machine, but left the mask on him all night, without supplemental oxygen, and this morning he was found to be saturating in the 60s. [facility]  He reports that he was told there that he could not be admitted because there were no beds.  He still feels short of breath and is quite worried. He has cough with white sputum at baseline, no severe worsening. Also has chest pain that is reproducible with touch that has been present for years  without acute changes. He denies any recent fevers or chills. Denies other complaint such as dysuria, leg swelling, headache, falls.     In the ED, tachycardic and tachypneic. % on home 3L. Afebrile and normotensive. CBC and CMP unremarkable. BNP and trop negative. Covid negative. VBG with compensated respiratory acidosis. CXR with hyperinflated lungs with emphysematous architecture. Linear scarring mid to upper right lung, similar to prior. Given duonebs x3 and methylprednisolone IV.

## 2023-01-26 NOTE — CARE UPDATE
Patient seen and evaluated by me this AM. Patient continues to endorse shortness of breath. He is also anxious to return back to his nursing home without them having the proper education on how to work his CPAP. Will continue duo nebs and steroids while inpatient. Has been weaned to home 3 L of O2 with sats >90%. Lungs clear to auscultation. Continue to monitor overnight.    Augusta Murillo PA-C  Hospital Medicine [facility]

## 2023-01-26 NOTE — ED NOTES
Pt identifiers Gabriel MAREN Avendano were checked and are correct  LOC: The patient is awake, alert, aware of environment with an appropriate affect. Oriented x4, speaking appropriately  APPEARANCE: Pt resting comfortably, in no acute distress, pt is clean and well groomed, clothing properly fastened  SKIN: Skin warm, dry and intact, normal skin turgor, moist mucus membranes  RESPIRATORY: Airway is open and patent, respirations are spontaneous,  Breath sounds diminished to all lung fields C PAP in place at 32 % O2  CARDIAC: Normal rate and rhythm, no peripheral edema noted, capillary refill < 3 seconds, bilateral radial pulses 2+  ABDOMEN: Soft, nontender, nondistended. Bowel sounds present   NEUROLOGIC: PERRL, facial expression is symmetrical, patient moving all extremities spontaneously, normal sensation in all extremities when touched with a finger.  Follows all commands appropriately  MUSCULOSKELETAL: No obvious deformities.

## 2023-01-26 NOTE — PHARMACY MED REC
"  Admission Medication History     The home medication history was taken by Avinash Mead.    You may go to "Admission" then "Reconcile Home Medications" tabs to review and/or act upon these items.     The home medication list has been updated by the Pharmacy department.   Please read ALL comments highlighted in yellow.   Please address this information as you see fit.    Feel free to contact us if you have any questions or require assistance.      The medications listed below were removed from the home medication list. Please reorder if appropriate:  Patient reports no longer taking the following medication(s):  ACETAMINOPHEN 500 MG  CALCIUM CARBONATE 600 MG (1500 MG) TAB  CELECOXIB 100 MG  CHOLECALCIFEROL, VIT D3 50 MCG, (2,000 UNIT) TAB  MULTIVIT-MIN-IRON FUM-FOLIC AC 7.5 M IRON-400 MCG TAB  POLYETHYLENE GLYCOL 17 GM POWDER  PRAVASTATIN 80 MG   PREDNISONE 10 MG     Medications listed below were obtained from: Nursing home  Current Outpatient Medications on File Prior to Encounter   Medication Sig    albuterol (PROVENTIL) 2.5 mg /3 mL (0.083 %) nebulizer solution   Take 2.5 mg by nebulization 2 (two) times daily as needed for Shortness of Breath.    albuterol (PROVENTIL/VENTOLIN HFA) 90 mcg/actuation inhaler Inhale 2 puffs into the lungs 4 (four) times daily as needed for Wheezing or Shortness of Breath. Rescue      busPIRone (BUSPAR) 15 MG tablet   Take 30 mg by mouth 2 (two) times daily.    camphor-menthol 0.5-0.5% (SARNA ORIGINAL) lotion Apply liberal amount topically 4 times daily as needed to itchy areas.      citalopram (CELEXA) 40 MG tablet   Take 40 mg by mouth once daily.    fluticasone-salmeterol diskus inhaler 250-50 mcg   Inhale 1 puff into the lungs 2 (two) times daily. Controller    guaiFENesin 100 mg/5 ml (ROBITUSSIN) 100 mg/5 mL syrup   Take 20 mLs by mouth 2 (two) times daily as needed (to loosen secretions).    hydrocortisone 2.5 % cream Apply small amount topically twice daily as needed for scaly " rash on face when itching or red.      ketoconazole (NIZORAL) 2 % cream Apply moderate amount topically daily to groin area for red, itchy rash.      ketoconazole (NIZORAL) 2 % shampoo Apply as directed topically 3 times weekly. Lather & leave in place for 5-10 minutes, rinse, repeat every other day. May be used with other shampoos, such as Head & Shoulders/Selsun.      prazosin (MINIPRESS) 5 MG capsule   Take 5 mg by mouth every evening.    roflumilast (DALIRESP) 500 mcg Tab   Take 500 mcg by mouth once daily.    tiotropium bromide (SPIRIVA RESPIMAT) 2.5 mcg/actuation inhaler   Inhale 2 puffs into the lungs Daily. Controller    traZODone (DESYREL) 100 MG tablet   Take 400 mg by mouth nightly as needed.    urea 20 % Crea Apply moderate amount topically twice daily for dry skin.         Potential issues to be addressed PRIOR TO DISCHARGE  Patient reported not taking the following medications: (VALIUM, ELIQUIS). These medications remain on the home medication list. Please address accordingly.     The listed medications were obtained from another facility ([facility] The patient may not have been able to fill these prescriptions prior to this admission and may require new scripts upon discharge.     Avinash Mead  EXT [id number]                .

## 2023-01-26 NOTE — ASSESSMENT & PLAN NOTE
Cont home buspar, celexa, and trazodone.    Regarding: difficulty breathing issue  ----- Message from Malu Guevara sent at 10/25/2022  4:12 PM EDT -----  "I had a sinus infection and took over the counter and I still have a dry cough and I have a lot of drainage  I am having a shortness of breath after coughing  I am suppose to colonoscopy and endoscopy I dont know if I should still have it done

## 2023-01-26 NOTE — SUBJECTIVE & OBJECTIVE
Past Medical History:   Diagnosis Date    Acute bronchospasm     Acute pharyngitis     Allergic urticaria     Anxiety disorder, unspecified     Cerebral atherosclerosis     Conjunctivitis     Constipation     COPD (chronic obstructive pulmonary disease)     Deficiency of other vitamins     Hypocalcemia     Insomnia     Malignant neoplasm of unspecified part of unspecified bronchus or lung     Mixed hyperlipidemia     Other pulmonary embolism without acute cor pulmonale     Pain     Pressure ulcer of unspecified site, unspecified stage     stage 2 of sacral region    PTSD (post-traumatic stress disorder)     Seborrhea capitis     Xerosis cutis        History reviewed. No pertinent surgical history.    Review of patient's allergies indicates:  No Known Allergies    No current facility-administered medications on file prior to encounter.     Current Outpatient Medications on File Prior to Encounter   Medication Sig    acetaminophen (TYLENOL) 500 MG tablet Take 500 mg by mouth every 6 (six) hours as needed.    albuterol (PROVENTIL) 2.5 mg /3 mL (0.083 %) nebulizer solution Take by nebulization.    busPIRone (BUSPAR) 15 MG tablet Take by mouth 2 (two) times daily.    calcium carbonate (OS-LING) 600 mg calcium (1,500 mg) Tab Take 1 tablet by mouth 2 (two) times daily.    celecoxib (CELEBREX) 100 MG capsule Take 100 mg by mouth 2 (two) times daily.    cholecalciferol, vitamin D3, (VITAMIN D3) 50 mcg (2,000 unit) Tab Take 1 tablet by mouth once daily.    citalopram (CELEXA) 40 MG tablet Take 40 mg by mouth once daily.    diazePAM (VALIUM) 5 MG tablet Take 5 mg by mouth 2 (two) times daily as needed.    ELIQUIS 5 mg Tab Take 5 mg by mouth 2 (two) times daily.    guaiFENesin (HUMIBID E) 400 mg Tab Take by mouth.    multivit-min-iron fum-folic ac 7.5 mg iron-400 mcg Tab Take by mouth.    polyethylene glycol (GLYCOLAX) 17 gram/dose powder Take by mouth.    pravastatin (PRAVACHOL) 80 MG tablet Take 80 mg by mouth daily as needed.     predniSONE (DELTASONE) 10 MG tablet Take by mouth.    traZODone (DESYREL) 100 MG tablet Take 200 mg by mouth nightly as needed.     Family History    None       Tobacco Use    Smoking status: Unknown    Smokeless tobacco: Not on file   Substance and Sexual Activity    Alcohol use: Not Currently    Drug use: Not Currently    Sexual activity: Not on file     Review of Systems   Constitutional:  Negative for activity change, chills and fever.   HENT:  Negative for trouble swallowing.    Eyes:  Negative for photophobia and visual disturbance.   Respiratory:  Positive for cough and shortness of breath. Negative for chest tightness and wheezing.    Cardiovascular:  Positive for chest pain (chronic, unchanged). Negative for palpitations and leg swelling.   Gastrointestinal:  Negative for abdominal pain, constipation, diarrhea, nausea and vomiting.   Genitourinary:  Negative for dysuria, frequency, hematuria and urgency.   Musculoskeletal:  Negative for arthralgias, back pain and gait problem (wheelchair at baseline).   Skin:  Positive for rash (after chemo). Negative for color change.   Neurological:  Negative for dizziness, syncope, light-headedness, numbness and headaches.   Psychiatric/Behavioral:  Negative for agitation and confusion. The patient is nervous/anxious.    Objective:     Vital Signs (Most Recent):  Temp: 98.8 °F (37.1 °C) (01/25/23 2043)  Pulse: 105 (01/26/23 0240)  Resp: (!) 24 (01/26/23 0240)  BP: 136/74 (01/26/23 0100)  SpO2: 98 % (01/26/23 0240)   Vital Signs (24h Range):  Temp:  [98.8 °F (37.1 °C)] 98.8 °F (37.1 °C)  Pulse:  [100-109] 105  Resp:  [17-24] 24  SpO2:  [98 %-100 %] 98 %  BP: (123-149)/(73-82) 136/74        There is no height or weight on file to calculate BMI.    Physical Exam  Vitals and nursing note reviewed.   Constitutional:       General: He is not in acute distress.     Appearance: He is well-developed.   HENT:      Head: Normocephalic and atraumatic.      Mouth/Throat:       Pharynx: No oropharyngeal exudate.   Eyes:      Extraocular Movements: Extraocular movements intact.      Conjunctiva/sclera: Conjunctivae normal.   Cardiovascular:      Rate and Rhythm: Regular rhythm. Tachycardia present.      Heart sounds: Normal heart sounds.   Pulmonary:      Comments: Diminished air movement. Some increased WOB  No wheezes, rales, rhonchi   Abdominal:      General: Bowel sounds are normal. There is no distension.      Palpations: Abdomen is soft.      Tenderness: There is no abdominal tenderness.   Musculoskeletal:         General: No tenderness. Normal range of motion.      Cervical back: Normal range of motion and neck supple.      Right lower leg: No edema.      Left lower leg: No edema.   Skin:     General: Skin is warm and dry.      Capillary Refill: Capillary refill takes less than 2 seconds.      Findings: Rash present.      Comments: Diffuse skin rash (present since chemotherapy tx per pt and follows with hemo/onc)   Neurological:      General: No focal deficit present.      Mental Status: He is alert and oriented to person, place, and time.      Cranial Nerves: No cranial nerve deficit.   Psychiatric:         Behavior: Behavior normal.         Thought Content: Thought content normal.         Judgment: Judgment normal.      Comments: Slightly anxious about prospect of being discharged at some point           Significant Labs: All pertinent labs within the past 24 hours have been reviewed.  ABGs:   Recent Labs   Lab 01/26/23  0240   PH 7.425   PCO2 54.9*   HCO3 36.1*   POCSATURATED 99   BE 12   PO2 139*     CBC:   Recent Labs   Lab 01/25/23 2146 01/26/23  0404   WBC 5.13 4.80   HGB 12.3* 11.8*   HCT 41.5 40.3    172     CMP:   Recent Labs   Lab 01/25/23 2146 01/26/23  0404    141   K 4.7 4.4   CL 96 98   CO2 36* 29   * 135*   BUN 13 14   CREATININE 0.9 0.8   CALCIUM 10.0 9.9   PROT 7.1  --    ALBUMIN 3.6  --    BILITOT 0.5  --    ALKPHOS 104  --    AST 22  --     ALT 19  --    ANIONGAP 9 14     Cardiac Markers:   Recent Labs   Lab 01/25/23  2146   BNP <10     Magnesium:   Recent Labs   Lab 01/26/23  0404   MG 2.0     Troponin:   Recent Labs   Lab 01/25/23  2146   TROPONINI 0.017       Significant Imaging: I have reviewed all pertinent imaging results/findings within the past 24 hours.  X-Ray Chest AP Portable  Narrative: EXAMINATION:  XR CHEST AP PORTABLE    CLINICAL HISTORY:  CHF;    TECHNIQUE:  Single frontal view of the chest was performed.    COMPARISON:  09/27/2022.    FINDINGS:  Hyperinflated lungs with emphysematous architecture.  Linear scarring mid to upper right lung, similar to prior.    No pneumothorax.    Heart and lungs  appear unchanged when allowing for differences in technique and positioning.  Impression: Hyperinflated lungs with emphysematous architecture. Linear scarring mid to upper right lung, similar to prior.    No significant change from prior study.    Electronically signed by: Milton De La Garza MD  Date:    01/26/2023  Time:    00:04

## 2023-01-26 NOTE — ASSESSMENT & PLAN NOTE
77year old male with COPD and lung cancer (follows with heme/onc) who presents with increased dyspnea, increased WOB. No increased sputum production. Reportedly had O2 sats of 60% at nursing home earlier. Covid negative, no recent illness.  - Afebrile without leukocytosis  - requires 3L supplemental oxygen at baseline  - pulse oximetry 98% on 3L nasal cannula  - CXR demonstrated Hyperinflated lungs with emphysematous architecture, no acute change  - Venous Blood Gas result:  pO2 139, pCO2 54.9; pH 7.425;  HCO3 36, %O2 Sat 99  - S/p nebs x3 and methylprednisolone in the ED  - started Prednisone 40 mg daily x 5 days   - scheduled DuoNebs ordered  - Mucinex ordered  - CPAP qhs  - discuss CPAP technique/set up with nursing home prior to discharge to ensure proper use

## 2023-01-26 NOTE — ED PROVIDER NOTES
"Chief Complaint   Shortness of Breath (SOB started last night. [facility] and [facility]. 3L [place] home oxygen---- 100%. Pt states "I think I need to get admitted." )      History Of Present Illness   Gabriel Avendano is a 77 y.o. male with history of COPD on home 3 L nasal cannula, hypertension, here with acute onset shortness of breath.  He reports that last night at his nursing home, he was put on a new CPAP machine and they did not know how to connect it to the machine, but left the mask on him all night, without supplemental oxygen, and this morning he was found to be saturating in the 60s, was [facility].  He reports that he was told there that he could not be admitted because there were no beds.  He still feels short of breath and would like another evaluation.  He denies any recent fevers or chills, felt well last night.  He overall feels like he is well taking care of at his living facility.    History obtained from:  Patient and [facility] of outside records [facility]    Review of patient's allergies indicates:  No Known Allergies    No current facility-administered medications on file prior to encounter.     Current Outpatient Medications on File Prior to Encounter   Medication Sig Dispense Refill    acetaminophen (TYLENOL) 500 MG tablet Take 500 mg by mouth every 6 (six) hours as needed.      albuterol (PROVENTIL) 2.5 mg /3 mL (0.083 %) nebulizer solution Take by nebulization.      busPIRone (BUSPAR) 15 MG tablet Take by mouth 2 (two) times daily.      calcium carbonate (OS-LING) 600 mg calcium (1,500 mg) Tab Take 1 tablet by mouth 2 (two) times daily.      celecoxib (CELEBREX) 100 MG capsule Take 100 mg by mouth 2 (two) times daily.      cholecalciferol, vitamin D3, (VITAMIN D3) 50 mcg (2,000 unit) Tab Take 1 tablet by mouth once daily.      citalopram (CELEXA) 40 MG tablet Take 40 mg by mouth once daily.      diazePAM (VALIUM) 5 MG tablet Take 5 mg by mouth 2 " (two) times daily as needed.      ELIQUIS 5 mg Tab Take 5 mg by mouth 2 (two) times daily.      guaiFENesin (HUMIBID E) 400 mg Tab Take by mouth.      multivit-min-iron fum-folic ac 7.5 mg iron-400 mcg Tab Take by mouth.      polyethylene glycol (GLYCOLAX) 17 gram/dose powder Take by mouth.      pravastatin (PRAVACHOL) 80 MG tablet Take 80 mg by mouth daily as needed.      predniSONE (DELTASONE) 10 MG tablet Take by mouth.      traZODone (DESYREL) 100 MG tablet Take 200 mg by mouth nightly as needed.         Past History   As per HPI and below:  Past Medical History:   Diagnosis Date    Acute bronchospasm     Acute pharyngitis     Allergic urticaria     Anxiety disorder, unspecified     Cerebral atherosclerosis     Conjunctivitis     Constipation     COPD (chronic obstructive pulmonary disease)     Deficiency of other vitamins     Hypocalcemia     Insomnia     Malignant neoplasm of unspecified part of unspecified bronchus or lung     Mixed hyperlipidemia     Other pulmonary embolism without acute cor pulmonale     Pain     Pressure ulcer of unspecified site, unspecified stage     stage 2 of sacral region    PTSD (post-traumatic stress disorder)     Seborrhea capitis     Xerosis cutis      History reviewed. No pertinent surgical history.    Social History     Socioeconomic History    Marital status: Single   Tobacco Use    Smoking status: Unknown   Substance and Sexual Activity    Alcohol use: Not Currently    Drug use: Not Currently       History reviewed. No pertinent family history.    Physical Exam     Vitals:    01/26/23 0025 01/26/23 0030 01/26/23 0100 01/26/23 0240   BP: 123/73  136/74    BP Location:       Patient Position:       Pulse: 100 100 107 105   Resp: 18 17 (!) 24 (!) 24   Temp:       TempSrc:       SpO2: 98% 99% 99% 98%     Gen: AxOx3, tachypneic, winded with extensive conversation but able to speak in short sentences, well nourished, appears stated age  Eye: EOMI, no scleral icterus, no periorbital  edema or ecchymosis  Head: normocephalic, atraumatic, no lesions, scalp appears normal  ENT: neck supple, no stridor, no masses, no drooling or voice changes  CVS: RRR, no m/r/g, distal pulses intact/symmetric  Pulm:  Diminished air movement in all lung fields, particularly at the bases, increased work of breathing as above, no wheezes, rales or rhonchi  Abd: soft, nontender, nondistended, no organomegaly, no CVAT  Ext: no edema, no lesions, rashes, or deformity  Neuro: GCS15, moving all extremities, gait not assessed, face grossly symmetric  Psych: normal affect, cooperative, well groomed, makes good eye contact    Initial Impression   This 77-year-old man with a history of COPD on home oxygen who reports profound desaturation earlier today, which has subsequently resolved and he is now satting well at his baseline oxygen requirement.  He does have increased work of breathing however and diminished air movement in all lung fields, so I do think he is having an acute exacerbation of COPD, and would benefit from nebs and steroids.  Will obtain labs and chest x-ray to further evaluate.    Medications Given     Medications   sodium chloride 0.9% flush 3 mL (has no administration in time range)   predniSONE tablet 40 mg (has no administration in time range)   albuterol-ipratropium 2.5 mg-0.5 mg/3 mL nebulizer solution 3 mL (has no administration in time range)   ondansetron disintegrating tablet 8 mg (has no administration in time range)   promethazine tablet 25 mg (has no administration in time range)   polyethylene glycol packet 17 g (has no administration in time range)   acetaminophen tablet 650 mg (has no administration in time range)   acetaminophen tablet 1,000 mg (has no administration in time range)   traZODone tablet 200 mg (has no administration in time range)   albuterol-ipratropium 2.5 mg-0.5 mg/3 mL nebulizer solution 3 mL (3 mLs Nebulization Given 1/26/23 0030)   methylPREDNISolone sodium succinate  injection 125 mg (125 mg Intravenous Given 1/26/23 0006)       Results and Course     Labs Reviewed   CBC W/ AUTO DIFFERENTIAL - Abnormal; Notable for the following components:       Result Value    Hemoglobin 12.3 (*)     MCH 26.6 (*)     MCHC 29.6 (*)     RDW 14.8 (*)     Lymph # 0.6 (*)     Mono # 0.1 (*)     Gran % 85.6 (*)     Lymph % 11.3 (*)     Mono % 2.7 (*)     All other components within normal limits   COMPREHENSIVE METABOLIC PANEL - Abnormal; Notable for the following components:    CO2 36 (*)     Glucose 117 (*)     All other components within normal limits   ISTAT PROCEDURE - Abnormal; Notable for the following components:    POC PCO2 54.9 (*)     POC PO2 139 (*)     POC HCO3 36.1 (*)     POC TCO2 38 (*)     All other components within normal limits   TROPONIN I   B-TYPE NATRIURETIC PEPTIDE   SARS-COV-2 RNA AMPLIFICATION, QUAL   BASIC METABOLIC PANEL   MAGNESIUM   PHOSPHORUS   CBC W/ AUTO DIFFERENTIAL       Imaging Results              X-Ray Chest AP Portable (Final result)  Result time 01/26/23 00:04:21      Final result by Milton De La Garza MD (01/26/23 00:04:21)                   Impression:      Hyperinflated lungs with emphysematous architecture. Linear scarring mid to upper right lung, similar to prior.    No significant change from prior study.      Electronically signed by: Miltno De La Garza MD  Date:    01/26/2023  Time:    00:04               Narrative:    EXAMINATION:  XR CHEST AP PORTABLE    CLINICAL HISTORY:  CHF;    TECHNIQUE:  Single frontal view of the chest was performed.    COMPARISON:  09/27/2022.    FINDINGS:  Hyperinflated lungs with emphysematous architecture.  Linear scarring mid to upper right lung, similar to prior.    No pneumothorax.    Heart and lungs  appear unchanged when allowing for differences in technique and positioning.                                               MDM   77 y.o. male with acute exacerbation of COPD.  Given his continued increased work of breathing  despite nebs, will bring him into the hospital for scheduled nebs.  Chest x-ray by my independent interpretation is unremarkable.  I doubt infectious cause, I think most likely he has had an exacerbation due to prolonged hypoxemia overnight.         Final diagnoses:  [R06.02] SOB (shortness of breath)  [J44.1] COPD with acute exacerbation (Primary)        ED Disposition Condition    Observation Stable                  Katie Montenegro MD  01/26/23 0353

## 2023-01-26 NOTE — AI DETERIORATION ALERT
RAPID RESPONSE NURSE AI ALERT       AI alert received.    Chart Reviewed: 01/26/2023, 2:37 PM    MRN: [id number]  Bed: ED 38/38    Dx: COPD with acute exacerbation    Gabriel Avendano has a past medical history of Acute bronchospasm, Acute pharyngitis, Allergic urticaria, Anxiety disorder, unspecified, Cerebral atherosclerosis, Conjunctivitis, Constipation, COPD (chronic obstructive pulmonary disease), Deficiency of other vitamins, Hypocalcemia, Insomnia, Malignant neoplasm of unspecified part of unspecified bronchus or lung, Mixed hyperlipidemia, Other pulmonary embolism without acute cor pulmonale, Pain, Pressure ulcer of unspecified site, unspecified stage, PTSD (post-traumatic stress disorder), Seborrhea capitis, and Xerosis cutis.    Last VS: BP (!) 142/68 (BP Location: Right arm, Patient Position: Lying)   Pulse 110   Temp 98.5 °F (36.9 °C) (Oral)   Resp (!) 24   SpO2 100%     24H Vital Sign Range:  Temp:  [97.6 °F (36.4 °C)-99.3 °F (37.4 °C)]   Pulse:  [100-116]   Resp:  [17-24]   BP: (123-149)/(65-82)   SpO2:  [96 %-100 %]     Level of Consciousness (AVPU): alert    Recent Labs     01/25/23  2146 01/26/23  0404   WBC 5.13 4.80   HGB 12.3* 11.8*   HCT 41.5 40.3    172       Recent Labs     01/25/23  2146 01/26/23  0404    141   K 4.7 4.4   CL 96 98   CO2 36* 29   CREATININE 0.9 0.8   * 135*   PHOS  --  2.8   MG  --  2.0        Recent Labs     01/26/23  0240   PH 7.425   PCO2 54.9*   PO2 139*   HCO3 36.1*   POCSATURATED 99   BE 12        OXYGEN:  Flow (L/min): 4  Oxygen Concentration (%): 32       MEWS score: 4     No concerns at this time. Call [phone] for further concerns or assistance.    Anabella Wallis RN

## 2023-01-26 NOTE — PLAN OF CARE
01/26/23 0116   Post-Acute Status   Post-Acute Authorization Other   Other Status No Post-Acute Service Needs   Discharge Delays None known at this time   Discharge Plan   Discharge Plan A Return to nursing home   Discharge Plan B Return to Nursing Home     SW spoke with patient. Patient is a [facility]. [facility].  EASTON Rob, MSW-LMSW  Medical Social Worker/  ER Department

## 2023-01-27 LAB
ANION GAP SERPL CALC-SCNC: 8 MMOL/L (ref 8–16)
BASOPHILS # BLD AUTO: 0.01 K/UL (ref 0–0.2)
BASOPHILS NFR BLD: 0.1 % (ref 0–1.9)
BUN SERPL-MCNC: 15 MG/DL (ref 8–23)
CALCIUM SERPL-MCNC: 9.5 MG/DL (ref 8.7–10.5)
CHLORIDE SERPL-SCNC: 98 MMOL/L (ref 95–110)
CO2 SERPL-SCNC: 34 MMOL/L (ref 23–29)
CREAT SERPL-MCNC: 0.8 MG/DL (ref 0.5–1.4)
DIFFERENTIAL METHOD: ABNORMAL
EOSINOPHIL # BLD AUTO: 0 K/UL (ref 0–0.5)
EOSINOPHIL NFR BLD: 0.4 % (ref 0–8)
ERYTHROCYTE [DISTWIDTH] IN BLOOD BY AUTOMATED COUNT: 15.2 % (ref 11.5–14.5)
EST. GFR  (NO RACE VARIABLE): >60 ML/MIN/1.73 M^2
GLUCOSE SERPL-MCNC: 95 MG/DL (ref 70–110)
HCT VFR BLD AUTO: 39.1 % (ref 40–54)
HGB BLD-MCNC: 11.6 G/DL (ref 14–18)
IMM GRANULOCYTES # BLD AUTO: 0.01 K/UL (ref 0–0.04)
IMM GRANULOCYTES NFR BLD AUTO: 0.1 % (ref 0–0.5)
LYMPHOCYTES # BLD AUTO: 1.3 K/UL (ref 1–4.8)
LYMPHOCYTES NFR BLD: 18 % (ref 18–48)
MAGNESIUM SERPL-MCNC: 2.1 MG/DL (ref 1.6–2.6)
MCH RBC QN AUTO: 27.1 PG (ref 27–31)
MCHC RBC AUTO-ENTMCNC: 29.7 G/DL (ref 32–36)
MCV RBC AUTO: 91 FL (ref 82–98)
MONOCYTES # BLD AUTO: 1.3 K/UL (ref 0.3–1)
MONOCYTES NFR BLD: 17.5 % (ref 4–15)
NEUTROPHILS # BLD AUTO: 4.6 K/UL (ref 1.8–7.7)
NEUTROPHILS NFR BLD: 63.9 % (ref 38–73)
NRBC BLD-RTO: 0 /100 WBC
PHOSPHATE SERPL-MCNC: 2.1 MG/DL (ref 2.7–4.5)
PLATELET # BLD AUTO: 190 K/UL (ref 150–450)
PMV BLD AUTO: 10.9 FL (ref 9.2–12.9)
POTASSIUM SERPL-SCNC: 3.9 MMOL/L (ref 3.5–5.1)
RBC # BLD AUTO: 4.28 M/UL (ref 4.6–6.2)
SODIUM SERPL-SCNC: 140 MMOL/L (ref 136–145)
WBC # BLD AUTO: 7.27 K/UL (ref 3.9–12.7)

## 2023-01-27 PROCEDURE — 85025 COMPLETE CBC W/AUTO DIFF WBC: CPT

## 2023-01-27 PROCEDURE — 83735 ASSAY OF MAGNESIUM: CPT

## 2023-01-27 PROCEDURE — 25000242 PHARM REV CODE 250 ALT 637 W/ HCPCS

## 2023-01-27 PROCEDURE — 99900035 HC TECH TIME PER 15 MIN (STAT)

## 2023-01-27 PROCEDURE — 94640 AIRWAY INHALATION TREATMENT: CPT | Mod: XB

## 2023-01-27 PROCEDURE — 25000003 PHARM REV CODE 250

## 2023-01-27 PROCEDURE — 63600175 PHARM REV CODE 636 W HCPCS

## 2023-01-27 PROCEDURE — 80048 BASIC METABOLIC PNL TOTAL CA: CPT

## 2023-01-27 PROCEDURE — 36415 COLL VENOUS BLD VENIPUNCTURE: CPT

## 2023-01-27 PROCEDURE — 94660 CPAP INITIATION&MGMT: CPT

## 2023-01-27 PROCEDURE — 25000242 PHARM REV CODE 250 ALT 637 W/ HCPCS: Performed by: STUDENT IN AN ORGANIZED HEALTH CARE EDUCATION/TRAINING PROGRAM

## 2023-01-27 PROCEDURE — 27000190 HC CPAP FULL FACE MASK W/VALVE

## 2023-01-27 PROCEDURE — 27000221 HC OXYGEN, UP TO 24 HOURS

## 2023-01-27 PROCEDURE — G0378 HOSPITAL OBSERVATION PER HR: HCPCS

## 2023-01-27 PROCEDURE — 94761 N-INVAS EAR/PLS OXIMETRY MLT: CPT

## 2023-01-27 PROCEDURE — 94640 AIRWAY INHALATION TREATMENT: CPT

## 2023-01-27 PROCEDURE — 84100 ASSAY OF PHOSPHORUS: CPT

## 2023-01-27 RX ORDER — SODIUM,POTASSIUM PHOSPHATES 280-250MG
1 POWDER IN PACKET (EA) ORAL
Status: COMPLETED | OUTPATIENT
Start: 2023-01-27 | End: 2023-01-27

## 2023-01-27 RX ADMIN — POTASSIUM & SODIUM PHOSPHATES POWDER PACK 280-160-250 MG 1 PACKET: 280-160-250 PACK at 08:01

## 2023-01-27 RX ADMIN — APIXABAN 5 MG: 5 TABLET, FILM COATED ORAL at 08:01

## 2023-01-27 RX ADMIN — IPRATROPIUM BROMIDE AND ALBUTEROL SULFATE 3 ML: 2.5; .5 SOLUTION RESPIRATORY (INHALATION) at 09:01

## 2023-01-27 RX ADMIN — GUAIFENESIN AND DEXTROMETHORPHAN 5 ML: 100; 10 SYRUP ORAL at 08:01

## 2023-01-27 RX ADMIN — APIXABAN 5 MG: 5 TABLET, FILM COATED ORAL at 09:01

## 2023-01-27 RX ADMIN — POTASSIUM & SODIUM PHOSPHATES POWDER PACK 280-160-250 MG 1 PACKET: 280-160-250 PACK at 11:01

## 2023-01-27 RX ADMIN — FLUTICASONE FUROATE AND VILANTEROL TRIFENATATE 1 PUFF: 100; 25 POWDER RESPIRATORY (INHALATION) at 10:01

## 2023-01-27 RX ADMIN — THERA TABS 1 TABLET: TAB at 11:01

## 2023-01-27 RX ADMIN — IPRATROPIUM BROMIDE AND ALBUTEROL SULFATE 3 ML: 2.5; .5 SOLUTION RESPIRATORY (INHALATION) at 10:01

## 2023-01-27 RX ADMIN — GUAIFENESIN AND DEXTROMETHORPHAN 5 ML: 100; 10 SYRUP ORAL at 10:01

## 2023-01-27 RX ADMIN — TRAZODONE HYDROCHLORIDE 200 MG: 50 TABLET ORAL at 01:01

## 2023-01-27 RX ADMIN — POTASSIUM & SODIUM PHOSPHATES POWDER PACK 280-160-250 MG 1 PACKET: 280-160-250 PACK at 09:01

## 2023-01-27 RX ADMIN — TIOTROPIUM BROMIDE INHALATION SPRAY 2 PUFF: 3.12 SPRAY, METERED RESPIRATORY (INHALATION) at 09:01

## 2023-01-27 RX ADMIN — BUSPIRONE HYDROCHLORIDE 15 MG: 10 TABLET ORAL at 08:01

## 2023-01-27 RX ADMIN — PRAVASTATIN SODIUM 80 MG: 80 TABLET ORAL at 09:01

## 2023-01-27 RX ADMIN — CITALOPRAM HYDROBROMIDE 40 MG: 20 TABLET ORAL at 09:01

## 2023-01-27 RX ADMIN — POTASSIUM & SODIUM PHOSPHATES POWDER PACK 280-160-250 MG 1 PACKET: 280-160-250 PACK at 05:01

## 2023-01-27 RX ADMIN — BUSPIRONE HYDROCHLORIDE 15 MG: 10 TABLET ORAL at 09:01

## 2023-01-27 RX ADMIN — PREDNISONE 40 MG: 20 TABLET ORAL at 09:01

## 2023-01-27 NOTE — PLAN OF CARE
NURSING HOME ORDERS    01/27/2023  [facility]  [facility] - OBSERVATION 11H  [address]  [facility] [address]  Dept: [phone]  Loc: [phone]     Admit to Nursing Home:  [facility]    Diagnoses:  Active Hospital Problems    Diagnosis  POA    *COPD with acute exacerbation [J44.1]  Yes    Anxiety [F41.9]  Yes    HLD (hyperlipidemia) [E78.5]  Yes    Other pulmonary embolism without acute cor pulmonale [I26.99]  Yes      Resolved Hospital Problems   No resolved problems to display.       Patient is homebound due to:  COPD with acute exacerbation    Allergies:Review of patient's allergies indicates:  No Known Allergies    Vitals:  Routine    Diet: cardiac diet    Activities:   Activity as tolerated    Goals of Care Treatment Preferences:  Code Status: Full Code      Labs:  Per facility protocol     Nursing Precautions:  Fall    Consults:   PT to evaluate and treat- 3 times a week and OT to evaluate and treat- 3 times a week     - CPAP qhs      Medications: Discontinue all previous medication orders, if any. See new list below.     Medication List        ASK your doctor about these medications      * albuterol 2.5 mg /3 mL (0.083 %) nebulizer solution  Commonly known as: PROVENTIL  Take 2.5 mg by nebulization 2 (two) times daily as needed for Shortness of Breath.     * albuterol 90 mcg/actuation inhaler  Commonly known as: PROVENTIL/VENTOLIN HFA  Inhale 2 puffs into the lungs 4 (four) times daily as needed for Wheezing or Shortness of Breath. Rescue     busPIRone 15 MG tablet  Commonly known as: BUSPAR  Take 30 mg by mouth 2 (two) times daily.     citalopram 40 MG tablet  Commonly known as: CeleXA  Take 40 mg by mouth once daily.     diazePAM 5 MG tablet  Commonly known as: VALIUM  Take 5 mg by mouth 2 (two) times daily as needed.     ELIQUIS 5 mg Tab  Generic drug: apixaban  Take 5 mg by mouth 2 (two) times daily.     fluticasone-salmeterol 250-50 mcg/dose 250-50 mcg/dose diskus  inhaler  Commonly known as: ADVAIR  Inhale 1 puff into the lungs 2 (two) times daily. Controller     guaiFENesin 100 mg/5 ml 100 mg/5 mL syrup  Commonly known as: ROBITUSSIN  Take 20 mLs by mouth 2 (two) times daily as needed (to loosen secretions).  Ask about: Which instructions should I use?     hydrocortisone 2.5 % cream  Apply small amount topically twice daily as needed for scaly rash on face when itching or red.     * ketoconazole 2 % cream  Commonly known as: NIZORAL  Apply moderate amount topically daily to groin area for red, itchy rash.     * ketoconazole 2 % shampoo  Commonly known as: NIZORAL  Apply as directed topically 3 times weekly. Lather & leave in place for 5-10 minutes, rinse, repeat every other day. May be used with other shampoos, such as Head & Shoulders/Selsun.     prazosin 5 MG capsule  Commonly known as: MINIPRESS  Take 5 mg by mouth every evening.     roflumilast 500 mcg Tab  Commonly known as: DALIRESP  Take 500 mcg by mouth once daily.     Sarna OriginaL lotion  Generic drug: camphor-menthol 0.5-0.5%  Apply liberal amount topically 4 times daily as needed to itchy areas.     SPIRIVA RESPIMAT 2.5 mcg/actuation inhaler  Generic drug: tiotropium bromide  Inhale 2 puffs into the lungs Daily. Controller     traZODone 100 MG tablet  Commonly known as: DESYREL  Take 400 mg by mouth nightly as needed.     urea 20 % Crea  Apply moderate amount topically twice daily for dry skin.           * This list has 4 medication(s) that are the same as other medications prescribed for you. Read the directions carefully, and ask your doctor or other care provider to review them with you.                    Immunizations Administered as of 1/27/2023       Name Date Dose VIS Date Route Exp Date    COVID-19, MRNA, LN-S, PF (Pfizer) (Purple Cap) 10/14/2021 -- -- -- --    COVID-19, MRNA, LN-S, PF (Pfizer) (Purple Cap) 1/29/2021 -- -- -- --    : Pf[employer] Inc     Lot: [id number]     COVID-19, MRNA, LN-S, PF  (Pfizer) (Purple Cap) 1/8/2021 -- -- -- --    : Pf[employer] Inc     Lot: [id number]             This patient has had both covid vaccinations    Some patients may experience side effects after vaccination.  These may include fever, headache, muscle or joint aches.  Most symptoms resolve with 24-48 hours and do not require urgent medical evaluation unless they persist for more than 72 hours or symptoms are concerning for an unrelated medical condition.          _________________________________  Augusta Murillo PA-C  01/27/2023

## 2023-01-27 NOTE — PLAN OF CARE
Problem: Respiratory Compromise COPD (Chronic Obstructive Pulmonary Disease)  Goal: Effective Oxygenation and Ventilation  Outcome: Ongoing, Progressing     Problem: Adult Inpatient Plan of Care  Goal: Plan of Care Review  Outcome: Ongoing, Progressing     Problem: Adult Inpatient Plan of Care  Goal: Optimal Comfort and Wellbeing  Outcome: Ongoing, Progressing     Problem: Infection COPD (Chronic Obstructive Pulmonary Disease)  Goal: Absence of Infection Signs and Symptoms  Outcome: Ongoing, Progressing

## 2023-01-27 NOTE — ASSESSMENT & PLAN NOTE
77year old male with COPD and lung cancer (follows with heme/onc) who presents with increased dyspnea, increased WOB. No increased sputum production. Reportedly had O2 sats of 60% at nursing home earlier. Covid negative, no recent illness.  - Afebrile without leukocytosis  - requires 3L supplemental oxygen at baseline  - pulse oximetry 98% on 3L nasal cannula  - CXR demonstrated Hyperinflated lungs with emphysematous architecture, no acute change  - Venous Blood Gas result:  pO2 139, pCO2 54.9; pH 7.425;  HCO3 36, %O2 Sat 99  - S/p nebs x3 and methylprednisolone in the ED  - started Prednisone 40 mg daily x 5 days   - DuoNebs ordered PRN  - continue home inhalers  - Mucinex ordered  - CPAP qhs  - discuss CPAP technique/set up with nursing home prior to discharge to ensure proper use

## 2023-01-27 NOTE — NURSING
Pt arrived into room, AAOx4, resp even and slightly labored, remains on 4L oxygen via NC with saturation of 95-98%. Oriented to room, call bell and phone. Family notified of admit. Tele monitor in place, currently ST with rate of 109 on monitor. Plan of care reviewed. Call bell within reach. Continue to monitor.

## 2023-01-27 NOTE — SUBJECTIVE & OBJECTIVE
Interval History: NAEON. AF, VSS. Patient seen and evaluated by me this AM. States he is doing much better and slept great with the CPAP. He is very anxious about returning to previous [facility] Told patient he is on the waiting list currently. CM notified team that patient is unable to return to NH until Monday. Will continue to monitor.     Review of Systems   Constitutional:  Negative for activity change, chills and fever.   HENT:  Negative for trouble swallowing.    Eyes:  Negative for photophobia and visual disturbance.   Respiratory:  Positive for shortness of breath (improved). Negative for cough, chest tightness and wheezing.    Cardiovascular:  Positive for chest pain (chronic, unchanged). Negative for palpitations and leg swelling.   Gastrointestinal:  Negative for abdominal pain, constipation, diarrhea, nausea and vomiting.   Genitourinary:  Negative for dysuria, frequency, hematuria and urgency.   Musculoskeletal:  Negative for arthralgias, back pain and gait problem (wheelchair at baseline).   Skin:  Positive for rash (after chemo). Negative for color change.   Neurological:  Negative for dizziness, syncope, light-headedness, numbness and headaches.   Psychiatric/Behavioral:  Negative for agitation and confusion. The patient is nervous/anxious.    Objective:     Vital Signs (Most Recent):  Temp: 98.2 °F (36.8 °C) (01/27/23 1259)  Pulse: 99 (01/27/23 1259)  Resp: 18 (01/27/23 1259)  BP: 127/79 (01/27/23 1259)  SpO2: 98 % (01/27/23 1259) Vital Signs (24h Range):  Temp:  [97.7 °F (36.5 °C)-99.5 °F (37.5 °C)] 98.2 °F (36.8 °C)  Pulse:  [] 99  Resp:  [14-22] 18  SpO2:  [94 %-100 %] 98 %  BP: (122-169)/(70-79) 127/79     Weight: 77.1 kg (169 lb 15.6 oz)  Body mass index is 27.43 kg/m².    Intake/Output Summary (Last 24 hours) at 1/27/2023 1551  Last data filed at 1/27/2023 0150  Gross per 24 hour   Intake --   Output 300 ml   Net -300 ml      Physical  Exam  Vitals and nursing note reviewed.   Constitutional:       General: He is not in acute distress.     Appearance: He is well-developed.   HENT:      Head: Normocephalic and atraumatic.      Mouth/Throat:      Pharynx: No oropharyngeal exudate.   Eyes:      Extraocular Movements: Extraocular movements intact.      Conjunctiva/sclera: Conjunctivae normal.   Cardiovascular:      Rate and Rhythm: Normal rate and regular rhythm.      Heart sounds: Normal heart sounds.   Pulmonary:      Effort: No respiratory distress.      Breath sounds: No wheezing, rhonchi or rales.   Abdominal:      General: Bowel sounds are normal. There is no distension.      Palpations: Abdomen is soft.      Tenderness: There is no abdominal tenderness.   Musculoskeletal:         General: No tenderness. Normal range of motion.      Cervical back: Normal range of motion and neck supple.      Right lower leg: No edema.      Left lower leg: No edema.   Skin:     General: Skin is warm and dry.      Capillary Refill: Capillary refill takes less than 2 seconds.      Findings: Rash present.      Comments: Diffuse skin rash (present since chemotherapy tx per pt and follows with hemo/onc)   Neurological:      General: No focal deficit present.      Mental Status: He is alert and oriented to person, place, and time.      Cranial Nerves: No cranial nerve deficit.   Psychiatric:         Behavior: Behavior normal.         Thought Content: Thought content normal.         Judgment: Judgment normal.      Comments: Slightly anxious about prospect of being discharged at some point       Significant Labs: All pertinent labs within the past 24 hours have been reviewed.  BMP:   Recent Labs   Lab 01/27/23  0312   GLU 95      K 3.9   CL 98   CO2 34*   BUN 15   CREATININE 0.8   CALCIUM 9.5   MG 2.1     CBC:   Recent Labs   Lab 01/25/23  2146 01/26/23  0404 01/27/23  0312   WBC 5.13 4.80 7.27   HGB 12.3* 11.8* 11.6*   HCT 41.5 40.3 39.1*    172 190        Significant Imaging: I have reviewed all pertinent imaging results/findings within the past 24 hours.    Imaging Results              X-Ray Chest AP Portable (Final result)  Result time 01/26/23 00:04:21      Final result by Milton De La Garza MD (01/26/23 00:04:21)                   Impression:      Hyperinflated lungs with emphysematous architecture. Linear scarring mid to upper right lung, similar to prior.    No significant change from prior study.      Electronically signed by: Milton De La Garza MD  Date:    01/26/2023  Time:    00:04               Narrative:    EXAMINATION:  XR CHEST AP PORTABLE    CLINICAL HISTORY:  CHF;    TECHNIQUE:  Single frontal view of the chest was performed.    COMPARISON:  09/27/2022.    FINDINGS:  Hyperinflated lungs with emphysematous architecture.  Linear scarring mid to upper right lung, similar to prior.    No pneumothorax.    Heart and lungs  appear unchanged when allowing for differences in technique and positioning.

## 2023-01-27 NOTE — PLAN OF CARE
01/27/23 1234   Post-Acute Status   Post-Acute Authorization Placement   Post-Acute Placement Status Referrals Sent     [facility] and is medically stable to return to the facility. AMERICA faxed referral to [phone] for review.    UPDATE    SW spoke with Salazar at [facility] ([phone]) and he reported that they will be able to take pt back; however, they will not be able to take him until Monday because they have a COVID outbreak and they don't have a bed to place him in today.    AMERICA updated the medical team of the conversation with Salazar.    3:00PM    AMERICA met with pt at bedside to discuss discharge plan with him returning to [facility] on Monday. Pt reported that he is in agreement with this plan and will be happy to return the facility. Pt reported that he will work with Salazar at [facility] to [facility].    AMERICA will continue to follow up.    Tete Cruz LMSW  [facility]  Ext. [phone]

## 2023-01-27 NOTE — HOSPITAL COURSE
Gabriel Avendano is a 77 y.o. male admitted to observation for COPD exacerbation. Patient weaned to 3 L O2 with sats >90%. Transitioned to duonebs PRN. Tolerating CPAP at night well. Patient continued to remain stable on 3 L of O2 without signs of distress. O2 sats remained stable throughout hospital stay. Labs and vitals stable. Patient is stable for discharge back to NH. All questions answered. Return precautions given. NH orders placed.

## 2023-01-27 NOTE — PROGRESS NOTES
[facility] - Observation [address] [place] [address] Medicine  Progress Note    Patient Name: Gabriel Avendano  MRN: [id number]  Patient Class: OP- Observation   Admission Date: 1/25/2023  Length of Stay: 0 days  Attending Physician: Natali Merritt MD  Primary Care Provider: Primary Doctor No        Subjective:     Principal Problem:COPD with acute exacerbation        HPI:  Gabriel Avendano is a 77M with COPD on home 3 L nasal cannula, HTN, HLD, lung cancer, anxiety presenting with acute onset shortness of breath.  He reports that last night at his nursing home, he was put on a new CPAP machine and they did not know how to connect it to the machine, but left the mask on him all night, without supplemental oxygen, and this morning he was found to be saturating in the 60s. [facility].  He reports that he was told there that he could not be admitted because there were no beds.  He still feels short of breath and is quite worried. He has cough with white sputum at baseline, no severe worsening. Also has chest pain that is reproducible with touch that has been present for years  without acute changes. He denies any recent fevers or chills. Denies other complaint such as dysuria, leg swelling, headache, falls.     In the ED, tachycardic and tachypneic. % on home 3L. Afebrile and normotensive. CBC and CMP unremarkable. BNP and trop negative. Covid negative. VBG with compensated respiratory acidosis. CXR with hyperinflated lungs with emphysematous architecture. Linear scarring mid to upper right lung, similar to prior. Given duonebs x3 and methylprednisolone IV.       Overview/Hospital Course:  Gabriel Avendano is a 77 y.o. male admitted to observation for COPD exacerbation. Patient weaned to 3 L O2 with sats >90%. Transitioned to duonebs PRN. Tolerating CPAP at night well. CM working on patient returning to NH. Unable t take patient back until Monday.       Interval History: NAEON. AF, VSS. Patient  seen and evaluated by me this AM. States he is doing much better and slept great with the CPAP. He is very anxious about returning to previous [facility] and [facility] Told patient he is on the waiting list currently. CM notified team that patient is unable to return to NH until Monday. Will continue to monitor.     Review of Systems   Constitutional:  Negative for activity change, chills and fever.   HENT:  Negative for trouble swallowing.    Eyes:  Negative for photophobia and visual disturbance.   Respiratory:  Positive for shortness of breath (improved). Negative for cough, chest tightness and wheezing.    Cardiovascular:  Positive for chest pain (chronic, unchanged). Negative for palpitations and leg swelling.   Gastrointestinal:  Negative for abdominal pain, constipation, diarrhea, nausea and vomiting.   Genitourinary:  Negative for dysuria, frequency, hematuria and urgency.   Musculoskeletal:  Negative for arthralgias, back pain and gait problem (wheelchair at baseline).   Skin:  Positive for rash (after chemo). Negative for color change.   Neurological:  Negative for dizziness, syncope, light-headedness, numbness and headaches.   Psychiatric/Behavioral:  Negative for agitation and confusion. The patient is nervous/anxious.    Objective:     Vital Signs (Most Recent):  Temp: 98.2 °F (36.8 °C) (01/27/23 1259)  Pulse: 99 (01/27/23 1259)  Resp: 18 (01/27/23 1259)  BP: 127/79 (01/27/23 1259)  SpO2: 98 % (01/27/23 1259) Vital Signs (24h Range):  Temp:  [97.7 °F (36.5 °C)-99.5 °F (37.5 °C)] 98.2 °F (36.8 °C)  Pulse:  [] 99  Resp:  [14-22] 18  SpO2:  [94 %-100 %] 98 %  BP: (122-169)/(70-79) 127/79     Weight: 77.1 kg (169 lb 15.6 oz)  Body mass index is 27.43 kg/m².    Intake/Output Summary (Last 24 hours) at 1/27/2023 1551  Last data filed at 1/27/2023 0150  Gross per 24 hour   Intake --   Output 300 ml   Net -300 ml      Physical Exam  Vitals and nursing note reviewed.    Constitutional:       General: He is not in acute distress.     Appearance: He is well-developed.   HENT:      Head: Normocephalic and atraumatic.      Mouth/Throat:      Pharynx: No oropharyngeal exudate.   Eyes:      Extraocular Movements: Extraocular movements intact.      Conjunctiva/sclera: Conjunctivae normal.   Cardiovascular:      Rate and Rhythm: Normal rate and regular rhythm.      Heart sounds: Normal heart sounds.   Pulmonary:      Effort: No respiratory distress.      Breath sounds: No wheezing, rhonchi or rales.   Abdominal:      General: Bowel sounds are normal. There is no distension.      Palpations: Abdomen is soft.      Tenderness: There is no abdominal tenderness.   Musculoskeletal:         General: No tenderness. Normal range of motion.      Cervical back: Normal range of motion and neck supple.      Right lower leg: No edema.      Left lower leg: No edema.   Skin:     General: Skin is warm and dry.      Capillary Refill: Capillary refill takes less than 2 seconds.      Findings: Rash present.      Comments: Diffuse skin rash (present since chemotherapy tx per pt and follows with hemo/onc)   Neurological:      General: No focal deficit present.      Mental Status: He is alert and oriented to person, place, and time.      Cranial Nerves: No cranial nerve deficit.   Psychiatric:         Behavior: Behavior normal.         Thought Content: Thought content normal.         Judgment: Judgment normal.      Comments: Slightly anxious about prospect of being discharged at some point       Significant Labs: All pertinent labs within the past 24 hours have been reviewed.  BMP:   Recent Labs   Lab 01/27/23  0312   GLU 95      K 3.9   CL 98   CO2 34*   BUN 15   CREATININE 0.8   CALCIUM 9.5   MG 2.1     CBC:   Recent Labs   Lab 01/25/23  2146 01/26/23  0404 01/27/23  0312   WBC 5.13 4.80 7.27   HGB 12.3* 11.8* 11.6*   HCT 41.5 40.3 39.1*    172 190       Significant Imaging: I have reviewed all  pertinent imaging results/findings within the past 24 hours.    Imaging Results              X-Ray Chest AP Portable (Final result)  Result time 01/26/23 00:04:21      Final result by Milton De La Garza MD (01/26/23 00:04:21)                   Impression:      Hyperinflated lungs with emphysematous architecture. Linear scarring mid to upper right lung, similar to prior.    No significant change from prior study.      Electronically signed by: Milton De La Garza MD  Date:    01/26/2023  Time:    00:04               Narrative:    EXAMINATION:  XR CHEST AP PORTABLE    CLINICAL HISTORY:  CHF;    TECHNIQUE:  Single frontal view of the chest was performed.    COMPARISON:  09/27/2022.    FINDINGS:  Hyperinflated lungs with emphysematous architecture.  Linear scarring mid to upper right lung, similar to prior.    No pneumothorax.    Heart and lungs  appear unchanged when allowing for differences in technique and positioning.                                          Assessment/Plan:      * COPD with acute exacerbation  77year old male with COPD and lung cancer (follows with heme/onc) who presents with increased dyspnea, increased WOB. No increased sputum production. Reportedly had O2 sats of 60% at nursing home earlier. Covid negative, no recent illness.  - Afebrile without leukocytosis  - requires 3L supplemental oxygen at baseline  - pulse oximetry 98% on 3L nasal cannula  - CXR demonstrated Hyperinflated lungs with emphysematous architecture, no acute change  - Venous Blood Gas result:  pO2 139, pCO2 54.9; pH 7.425;  HCO3 36, %O2 Sat 99  - S/p nebs x3 and methylprednisolone in the ED  - started Prednisone 40 mg daily x 5 days   - DuoNebs ordered PRN  - continue home inhalers  - Mucinex ordered  - CPAP qhs  - discuss CPAP technique/set up with nursing home prior to discharge to ensure proper use    Other pulmonary embolism without acute cor pulmonale  -history noted, continue home eliquis 5 mg BID    HLD  (hyperlipidemia)  -cont home pravastatin     Anxiety  Cont home buspar, celexa, and trazodone.       VTE Risk Mitigation (From admission, onward)         Ordered     apixaban tablet 5 mg  2 times daily         01/26/23 0612     IP VTE HIGH RISK PATIENT  Once         01/26/23 0209     Place sequential compression device  Until discontinued         01/26/23 0209                Discharge Planning   ZACKARY: 1/30/2023     Code Status: Full Code   Is the patient medically ready for discharge?: No    Reason for patient still in hospital (select all that apply): Patient trending condition and Pending disposition  Discharge Plan A: Return to nursing home   Discharge Delays: None known at this time              Augusta Murillo PA-C  Department of Hospital Medicine   [facility] - Observation 11H

## 2023-01-28 VITALS
SYSTOLIC BLOOD PRESSURE: 150 MMHG | RESPIRATION RATE: 18 BRPM | OXYGEN SATURATION: 95 % | BODY MASS INDEX: 27.32 KG/M2 | TEMPERATURE: 99 F | HEIGHT: 66 IN | DIASTOLIC BLOOD PRESSURE: 76 MMHG | WEIGHT: 170 LBS | HEART RATE: 98 BPM

## 2023-01-28 PROCEDURE — 99239 HOSP IP/OBS DSCHRG MGMT >30: CPT | Mod: ,,,

## 2023-01-28 PROCEDURE — G0378 HOSPITAL OBSERVATION PER HR: HCPCS

## 2023-01-28 PROCEDURE — 99239 PR HOSPITAL DISCHARGE DAY,>30 MIN: ICD-10-PCS | Mod: ,,,

## 2023-01-28 NOTE — NURSING
Pt resting well in bed, no distress noted, evening meds given, prn neb trmt given by resp. still awaiting transport back to NH

## 2023-01-28 NOTE — PLAN OF CARE
AMERICA contacted Patient flow center for the second time. According to Yves it will be 2 more hours. ETA for patient  is @12:10am.    EASTON Rob, MSW-LMSW  Medical Social Worker/  ER Department

## 2023-01-28 NOTE — PLAN OF CARE
On-call CM received call that patient has been accepted to return to [facility] today.   Ambulance stretcher requested with [facility] (ext [phone]) for 8 PM pickup time.   Patient is going to room 244.   Report may be called to the nurse, Yancy, between 7:15 to 7:30 pm at [phone].

## 2023-01-28 NOTE — PLAN OF CARE
AMERICA contacted Patient Transfer center again for ETA of transportation. ETA is 3:30am     SW did ask for other transportation options. According to patient transfer another option will be even later @6:30am.    AMERICA kept original transport company. ETA will be 3:30am.     EASTON Rob, MSW-LMSW  Medical Social Worker/  ER Department

## 2023-01-28 NOTE — CONSULTS
Thank you for your consult to [facility]. We have reviewed the patient chart. This patient does not meet criteria for [facility] service at this time due to Patient is unlikely to participate well with the telemedicine platform due to discharge planned. and Patient has a condition likely to benefit from in-depth physical exam, or palpation / auscultation, or serial abdominal exams, or COPD exac . Will hand back to In-house service.

## 2023-01-29 NOTE — ASSESSMENT & PLAN NOTE
77year old male with COPD and lung cancer (follows with heme/onc) who presents with increased dyspnea, increased WOB. No increased sputum production. Reportedly had O2 sats of 60% at nursing home earlier. Covid negative, no recent illness.  - Afebrile without leukocytosis  - requires 3L supplemental oxygen at baseline  - pulse oximetry 98% on 3L nasal cannula  - CXR demonstrated Hyperinflated lungs with emphysematous architecture, no acute change  - Venous Blood Gas result:  pO2 139, pCO2 54.9; pH 7.425;  HCO3 36, %O2 Sat 99  - S/p nebs x3 and methylprednisolone in the ED  - started Prednisone 40 mg daily while inpatient  - DuoNebs ordered PRN  - continue home inhalers  - Mucinex ordered  - CPAP qhs  - discuss CPAP technique/set up with nursing home prior to discharge to ensure proper use

## 2023-01-29 NOTE — DISCHARGE SUMMARY
[facility] [address] [place] [address] Medicine  Discharge Summary      Patient Name: Gabriel Avendano  MRN: [id number]  KELLY: [id number]  Patient Class: OP- Observation  Admission Date: 1/25/2023  Hospital Length of Stay: 0 days  Discharge Date and Time:  01/29/2023 4:49 PM  Attending Physician: Tammie att. providers found   Discharging Provider: Augusta Murillo PA-C  Primary Care Provider: Primary Doctor Tammie  [facility] Medicine Team: [facility] MED E Augusta Murillo PA-C  Primary Care Team: [facility]OS[facility] MED E    HPI:   Gabriel Avendano is a 77M with COPD on home 3 L nasal cannula, HTN, HLD, lung cancer, anxiety presenting with acute onset shortness of breath.  He reports that last night at his nursing home, he was put on a new CPAP machine and they did not know how to connect it to the machine, but left the mask on him all night, without supplemental oxygen, and this morning he was found to be saturating in the 60s. [facility].  He reports that he was told there that he could not be admitted because there were no beds.  He still feels short of breath and is quite worried. He has cough with white sputum at baseline, no severe worsening. Also has chest pain that is reproducible with touch that has been present for years  without acute changes. He denies any recent fevers or chills. Denies other complaint such as dysuria, leg swelling, headache, falls.     In the ED, tachycardic and tachypneic. % on home 3L. Afebrile and normotensive. CBC and CMP unremarkable. BNP and trop negative. Covid negative. VBG with compensated respiratory acidosis. CXR with hyperinflated lungs with emphysematous architecture. Linear scarring mid to upper right lung, similar to prior. Given duonebs x3 and methylprednisolone IV.       * No surgery found *      Hospital Course:   Gabriel Avendano is a 77 y.o. male admitted to observation for COPD exacerbation. Patient weaned to 3 L O2 with sats >90%. Transitioned to  duonebs PRN. Tolerating CPAP at night well. Patient continued to remain stable on 3 L of O2 without signs of distress. O2 sats remained stable throughout hospital stay. Labs and vitals stable. Patient is stable for discharge back to NH. All questions answered. Return precautions given. NH orders placed.        Goals of Care Treatment Preferences:  Code Status: Full Code      Consults:   Consults (From admission, onward)        Status Ordering Provider     Inpatient virtual consult to Hospital Medicine  Once        Provider:  (Not yet assigned)    Completed KEYANNA MARROQUIN          * COPD with acute exacerbation  77year old male with COPD and lung cancer (follows with heme/onc) who presents with increased dyspnea, increased WOB. No increased sputum production. Reportedly had O2 sats of 60% at nursing home earlier. Covid negative, no recent illness.  - Afebrile without leukocytosis  - requires 3L supplemental oxygen at baseline  - pulse oximetry 98% on 3L nasal cannula  - CXR demonstrated Hyperinflated lungs with emphysematous architecture, no acute change  - Venous Blood Gas result:  pO2 139, pCO2 54.9; pH 7.425;  HCO3 36, %O2 Sat 99  - S/p nebs x3 and methylprednisolone in the ED  - started Prednisone 40 mg daily while inpatient  - DuoNebs ordered PRN  - continue home inhalers  - Mucinex ordered  - CPAP qhs  - discuss CPAP technique/set up with nursing home prior to discharge to ensure proper use    Other pulmonary embolism without acute cor pulmonale  -history noted, continue home eliquis 5 mg BID    HLD (hyperlipidemia)  -cont home pravastatin     Anxiety  Cont home buspar, celexa, and trazodone.       Final Active Diagnoses:    Diagnosis Date Noted POA    PRINCIPAL PROBLEM:  COPD with acute exacerbation [J44.1] 01/26/2023 Yes    Anxiety [F41.9] 01/26/2023 Yes    HLD (hyperlipidemia) [E78.5] 01/26/2023 Yes    Other pulmonary embolism without acute cor pulmonale [I26.99] 01/26/2023 Yes      Problems Resolved  During this Admission:       Discharged Condition: stable    Disposition: group home Nursing Home    Follow Up:    Patient Instructions:      Diet Adult Regular     Notify your health care provider if you experience any of the following:  temperature >100.4     Notify your health care provider if you experience any of the following:  persistent nausea and vomiting or diarrhea     Notify your health care provider if you experience any of the following:  persistent dizziness, light-headedness, or visual disturbances     Notify your health care provider if you experience any of the following:  increased confusion or weakness     Activity as tolerated       Significant Diagnostic Studies: Labs: BMP: No results for input(s): GLU, NA, K, CL, CO2, BUN, CREATININE, CALCIUM, MG in the last 48 hours. and CBC No results for input(s): WBC, HGB, HCT, PLT in the last 48 hours.    Pending Diagnostic Studies:     None         Medications:  Reconciled Home Medications:      Medication List      CONTINUE taking these medications    * albuterol 2.5 mg /3 mL (0.083 %) nebulizer solution  Commonly known as: PROVENTIL  Take 2.5 mg by nebulization 2 (two) times daily as needed for Shortness of Breath.     * albuterol 90 mcg/actuation inhaler  Commonly known as: PROVENTIL/VENTOLIN HFA  Inhale 2 puffs into the lungs 4 (four) times daily as needed for Wheezing or Shortness of Breath. Rescue     busPIRone 15 MG tablet  Commonly known as: BUSPAR  Take 30 mg by mouth 2 (two) times daily.     citalopram 40 MG tablet  Commonly known as: CeleXA  Take 40 mg by mouth once daily.     diazePAM 5 MG tablet  Commonly known as: VALIUM  Take 5 mg by mouth 2 (two) times daily as needed.     ELIQUIS 5 mg Tab  Generic drug: apixaban  Take 5 mg by mouth 2 (two) times daily.     fluticasone-salmeterol 250-50 mcg/dose 250-50 mcg/dose diskus inhaler  Commonly known as: ADVAIR  Inhale 1 puff into the lungs 2 (two) times daily. Controller     guaiFENesin 100 mg/5  ml 100 mg/5 mL syrup  Commonly known as: ROBITUSSIN  Take 20 mLs by mouth 2 (two) times daily as needed (to loosen secretions).     hydrocortisone 2.5 % cream  Apply small amount topically twice daily as needed for scaly rash on face when itching or red.     * ketoconazole 2 % cream  Commonly known as: NIZORAL  Apply moderate amount topically daily to groin area for red, itchy rash.     * ketoconazole 2 % shampoo  Commonly known as: NIZORAL  Apply as directed topically 3 times weekly. Lather & leave in place for 5-10 minutes, rinse, repeat every other day. May be used with other shampoos, such as Head & Shoulders/Selsun.     prazosin 5 MG capsule  Commonly known as: MINIPRESS  Take 5 mg by mouth every evening.     roflumilast 500 mcg Tab  Commonly known as: DALIRESP  Take 500 mcg by mouth once daily.     Sarna OriginaL lotion  Generic drug: camphor-menthol 0.5-0.5%  Apply liberal amount topically 4 times daily as needed to itchy areas.     SPIRIVA RESPIMAT 2.5 mcg/actuation inhaler  Generic drug: tiotropium bromide  Inhale 2 puffs into the lungs Daily. Controller     traZODone 100 MG tablet  Commonly known as: DESYREL  Take 400 mg by mouth nightly as needed.     urea 20 % Crea  Apply moderate amount topically twice daily for dry skin.         * This list has 4 medication(s) that are the same as other medications prescribed for you. Read the directions carefully, and ask your doctor or other care provider to review them with you.                Indwelling Lines/Drains at time of discharge:   Lines/Drains/Airways     None                 Time spent on the discharge of patient: 36 minutes         Augusta Murillo PA-C  Department of Hospital Medicine  [facility]

## 2023-01-31 NOTE — PLAN OF CARE
[facility]  Discharge Final Note    Primary Care Provider: Primary Doctor No    Expected Discharge Date: 1/27/2023    Patient discharged to [facility] ambulance transportation.     Patient's bedside nurse and patient notified of the above.      Final Discharge Note (most recent)       Final Note - 01/31/23 0938          Final Note    Assessment Type Final Discharge Note (P)      Anticipated Discharge Disposition [facility] (P)         Post-Acute Status    Post-Acute Authorization Placement (P)      Post-Acute Placement Status Set-up Complete/Auth obtained (P)                      Important Message from Medicare                 No future appointments.    SW scheduled post-discharge follow-up appointment and information added to AVS.     Tete Cruz LMSW  [facility]  Ext. [phone]

## 2023-05-01 PROBLEM — I26.99 OTHER PULMONARY EMBOLISM WITHOUT ACUTE COR PULMONALE: Status: RESOLVED | Noted: 2023-01-26 | Resolved: 2023-05-01

## 2024-02-24 NOTE — ED NOTES
Called report to Marnie at [facility].  Notified that supervisor Marisel Flores approved transfer by EMS back because pt is on oxygen.  
LOC: The patient is awake, alert and aware of environment with an appropriate affect, the patient is oriented x 3 and speaking appropriately.     Psych: Patient is calm and cooperative with good eye contact.    APPEARANCE: Patient is clean and non toxic appearing    NEUROLOGIC:  CYNTHIA, Follows commands without difficulty. Speech is clear. No neuro deficits observed.    HEENT: Denies HEENT complaint or injury, moist mucus membranes     RESPIRATORY: Airway is open and patent, respirations are spontaneous; patient has a normal effort and rate. Bilateral breath sounds are clear diminished in lower lobes. Pink nailbeds.     CARDIAC: Patient has a normal rate and rhythm, no peripheral edema noted, capillary refill < 2 seconds. PULSES are symmetrical in all extremities    GI/ : Soft and non tender to palpation, no distention noted.     MUSCULOSKELETAL:  Normal range of motion noted. Moves all extremities well, No swelling, deformity or tenderness noted.    SKIN: The skin is warm, dry and intact. Patient has normal skin turgor and dry mucus membranes. Numerous dry skin patches noted throughout body  
(M6) obeys commands